# Patient Record
Sex: FEMALE | Race: WHITE | Employment: OTHER | URBAN - METROPOLITAN AREA
[De-identification: names, ages, dates, MRNs, and addresses within clinical notes are randomized per-mention and may not be internally consistent; named-entity substitution may affect disease eponyms.]

---

## 2018-01-21 PROBLEM — R19.7 DIARRHEA: Status: ACTIVE | Noted: 2018-01-21

## 2018-01-23 PROBLEM — K52.9 COLITIS: Status: ACTIVE | Noted: 2018-01-23

## 2018-01-30 PROBLEM — R26.9 GAIT DISTURBANCE: Status: ACTIVE | Noted: 2018-01-30

## 2018-01-30 PROBLEM — E86.0 DEHYDRATION, MODERATE: Status: ACTIVE | Noted: 2018-01-30

## 2018-01-30 PROBLEM — R29.898 LEG WEAKNESS, BILATERAL: Status: ACTIVE | Noted: 2018-01-30

## 2018-02-01 ENCOUNTER — HOSPITAL ENCOUNTER (OUTPATIENT)
Dept: OTHER | Age: 83
Discharge: HOME | End: 2018-02-01
Attending: INTERNAL MEDICINE | Admitting: INTERNAL MEDICINE

## 2018-02-09 LAB
ALBUMIN SERPL-MCNC: 3.6 GM/DL (ref 3.4–5)
ALP BLD-CCNC: 85 IU/L (ref 40–129)
ALT SERPL-CCNC: 7 U/L (ref 10–40)
ANION GAP SERPL CALCULATED.3IONS-SCNC: 14 MMOL/L (ref 4–16)
AST SERPL-CCNC: 11 IU/L (ref 15–37)
BILIRUB SERPL-MCNC: 0.3 MG/DL (ref 0–1)
BUN BLDV-MCNC: 17 MG/DL (ref 6–23)
CALCIUM SERPL-MCNC: 8.8 MG/DL (ref 8.3–10.6)
CHLORIDE BLD-SCNC: 101 MMOL/L (ref 99–110)
CO2: 27 MMOL/L (ref 21–32)
CREAT SERPL-MCNC: 1.1 MG/DL (ref 0.6–1.1)
GFR AFRICAN AMERICAN: 56 ML/MIN/1.73M2
GFR NON-AFRICAN AMERICAN: 46 ML/MIN/1.73M2
GLUCOSE BLD-MCNC: 87 MG/DL (ref 70–99)
HCT VFR BLD CALC: 36.3 % (ref 37–47)
HEMOGLOBIN: 10.9 GM/DL (ref 12.5–16)
MCH RBC QN AUTO: 26.9 PG (ref 27–31)
MCHC RBC AUTO-ENTMCNC: 30 % (ref 32–36)
MCV RBC AUTO: 89.6 FL (ref 78–100)
PDW BLD-RTO: 17.2 % (ref 11.7–14.9)
PLATELET # BLD: 343 K/CU MM (ref 140–440)
PMV BLD AUTO: 10.6 FL (ref 7.5–11.1)
POTASSIUM SERPL-SCNC: 4.4 MMOL/L (ref 3.5–5.1)
RBC # BLD: 4.05 M/CU MM (ref 4.2–5.4)
SODIUM BLD-SCNC: 142 MMOL/L (ref 135–145)
TOTAL PROTEIN: 7 GM/DL (ref 6.4–8.2)
WBC # BLD: 5.9 K/CU MM (ref 4–10.5)

## 2019-02-14 PROBLEM — K45.8 OTHER SPECIFIED ABDOMINAL HERNIA WITHOUT OBSTRUCTION OR GANGRENE: Status: ACTIVE | Noted: 2019-02-14

## 2019-06-07 ENCOUNTER — HOSPITAL ENCOUNTER (OUTPATIENT)
Dept: GENERAL RADIOLOGY | Age: 84
Discharge: HOME | End: 2019-06-07
Payer: MEDICARE

## 2019-06-07 ENCOUNTER — HOSPITAL ENCOUNTER (OUTPATIENT)
Age: 84
Discharge: HOME | End: 2019-06-07
Payer: MEDICARE

## 2019-06-07 DIAGNOSIS — R60.9 SWELLING: ICD-10-CM

## 2019-06-07 PROCEDURE — 73080 X-RAY EXAM OF ELBOW: CPT

## 2021-06-08 ENCOUNTER — APPOINTMENT (OUTPATIENT)
Dept: CT IMAGING | Age: 86
DRG: 392 | End: 2021-06-08
Payer: MEDICARE

## 2021-06-08 ENCOUNTER — HOSPITAL ENCOUNTER (INPATIENT)
Age: 86
LOS: 7 days | Discharge: OTHER INSTITUTION | DRG: 392 | End: 2021-06-15
Attending: EMERGENCY MEDICINE | Admitting: INTERNAL MEDICINE
Payer: MEDICARE

## 2021-06-08 DIAGNOSIS — E87.6 HYPOKALEMIA: ICD-10-CM

## 2021-06-08 DIAGNOSIS — I71.40 ABDOMINAL AORTIC ANEURYSM (AAA) WITHOUT RUPTURE: ICD-10-CM

## 2021-06-08 DIAGNOSIS — R77.8 TROPONIN I ABOVE REFERENCE RANGE: ICD-10-CM

## 2021-06-08 DIAGNOSIS — R19.7 DIARRHEA, UNSPECIFIED TYPE: Primary | ICD-10-CM

## 2021-06-08 DIAGNOSIS — K52.9 COLITIS: ICD-10-CM

## 2021-06-08 DIAGNOSIS — N17.9 AKI (ACUTE KIDNEY INJURY) (HCC): ICD-10-CM

## 2021-06-08 DIAGNOSIS — R53.83 FATIGUE, UNSPECIFIED TYPE: ICD-10-CM

## 2021-06-08 LAB
ALBUMIN SERPL-MCNC: 3.5 GM/DL (ref 3.4–5)
ALP BLD-CCNC: 95 IU/L (ref 40–129)
ALT SERPL-CCNC: <5 U/L (ref 10–40)
ANION GAP SERPL CALCULATED.3IONS-SCNC: 12 MMOL/L (ref 4–16)
AST SERPL-CCNC: 9 IU/L (ref 15–37)
BASOPHILS ABSOLUTE: 0 K/CU MM
BASOPHILS RELATIVE PERCENT: 0.3 % (ref 0–1)
BILIRUB SERPL-MCNC: 0.2 MG/DL (ref 0–1)
BUN BLDV-MCNC: 25 MG/DL (ref 6–23)
CALCIUM SERPL-MCNC: 8.7 MG/DL (ref 8.3–10.6)
CHLORIDE BLD-SCNC: 97 MMOL/L (ref 99–110)
CO2: 27 MMOL/L (ref 21–32)
CREAT SERPL-MCNC: 1.5 MG/DL (ref 0.6–1.1)
DIFFERENTIAL TYPE: ABNORMAL
EOSINOPHILS ABSOLUTE: 0.1 K/CU MM
EOSINOPHILS RELATIVE PERCENT: 1.2 % (ref 0–3)
GFR AFRICAN AMERICAN: 39 ML/MIN/1.73M2
GFR NON-AFRICAN AMERICAN: 32 ML/MIN/1.73M2
GLUCOSE BLD-MCNC: 108 MG/DL (ref 70–99)
HCT VFR BLD CALC: 36.4 % (ref 37–47)
HEMOGLOBIN: 11.3 GM/DL (ref 12.5–16)
IMMATURE NEUTROPHIL %: 0.4 % (ref 0–0.43)
LYMPHOCYTES ABSOLUTE: 0.7 K/CU MM
LYMPHOCYTES RELATIVE PERCENT: 8.1 % (ref 24–44)
MAGNESIUM: 1.9 MG/DL (ref 1.8–2.4)
MCH RBC QN AUTO: 27.7 PG (ref 27–31)
MCHC RBC AUTO-ENTMCNC: 31 % (ref 32–36)
MCV RBC AUTO: 89.2 FL (ref 78–100)
MONOCYTES ABSOLUTE: 1.1 K/CU MM
MONOCYTES RELATIVE PERCENT: 12.5 % (ref 0–4)
NUCLEATED RBC %: 0 %
PDW BLD-RTO: 14.7 % (ref 11.7–14.9)
PLATELET # BLD: 251 K/CU MM (ref 140–440)
PMV BLD AUTO: 10.6 FL (ref 7.5–11.1)
POTASSIUM SERPL-SCNC: 2.9 MMOL/L (ref 3.5–5.1)
RBC # BLD: 4.08 M/CU MM (ref 4.2–5.4)
SEGMENTED NEUTROPHILS ABSOLUTE COUNT: 7 K/CU MM
SEGMENTED NEUTROPHILS RELATIVE PERCENT: 77.5 % (ref 36–66)
SODIUM BLD-SCNC: 136 MMOL/L (ref 135–145)
TOTAL IMMATURE NEUTOROPHIL: 0.04 K/CU MM
TOTAL NUCLEATED RBC: 0 K/CU MM
TOTAL PROTEIN: 6.8 GM/DL (ref 6.4–8.2)
TROPONIN T: 0.03 NG/ML
WBC # BLD: 9 K/CU MM (ref 4–10.5)

## 2021-06-08 PROCEDURE — 85025 COMPLETE CBC W/AUTO DIFF WBC: CPT

## 2021-06-08 PROCEDURE — 6360000002 HC RX W HCPCS: Performed by: EMERGENCY MEDICINE

## 2021-06-08 PROCEDURE — 83735 ASSAY OF MAGNESIUM: CPT

## 2021-06-08 PROCEDURE — 70450 CT HEAD/BRAIN W/O DYE: CPT

## 2021-06-08 PROCEDURE — 74176 CT ABD & PELVIS W/O CONTRAST: CPT

## 2021-06-08 PROCEDURE — 6370000000 HC RX 637 (ALT 250 FOR IP): Performed by: EMERGENCY MEDICINE

## 2021-06-08 PROCEDURE — 80053 COMPREHEN METABOLIC PANEL: CPT

## 2021-06-08 PROCEDURE — 1200000000 HC SEMI PRIVATE

## 2021-06-08 PROCEDURE — 84484 ASSAY OF TROPONIN QUANT: CPT

## 2021-06-08 PROCEDURE — 81001 URINALYSIS AUTO W/SCOPE: CPT

## 2021-06-08 PROCEDURE — 2500000003 HC RX 250 WO HCPCS: Performed by: EMERGENCY MEDICINE

## 2021-06-08 PROCEDURE — 2580000003 HC RX 258: Performed by: EMERGENCY MEDICINE

## 2021-06-08 PROCEDURE — 99284 EMERGENCY DEPT VISIT MOD MDM: CPT

## 2021-06-08 PROCEDURE — 93005 ELECTROCARDIOGRAM TRACING: CPT | Performed by: EMERGENCY MEDICINE

## 2021-06-08 RX ORDER — 0.9 % SODIUM CHLORIDE 0.9 %
500 INTRAVENOUS SOLUTION INTRAVENOUS ONCE
Status: COMPLETED | OUTPATIENT
Start: 2021-06-08 | End: 2021-06-08

## 2021-06-08 RX ORDER — CIPROFLOXACIN 2 MG/ML
400 INJECTION, SOLUTION INTRAVENOUS ONCE
Status: COMPLETED | OUTPATIENT
Start: 2021-06-08 | End: 2021-06-09

## 2021-06-08 RX ADMIN — SODIUM CHLORIDE 500 ML: 9 INJECTION, SOLUTION INTRAVENOUS at 20:58

## 2021-06-08 RX ADMIN — METRONIDAZOLE 500 MG: 500 INJECTION, SOLUTION INTRAVENOUS at 23:53

## 2021-06-08 RX ADMIN — POTASSIUM BICARBONATE 40 MEQ: 782 TABLET, EFFERVESCENT ORAL at 20:58

## 2021-06-08 RX ADMIN — CIPROFLOXACIN 400 MG: 2 INJECTION, SOLUTION INTRAVENOUS at 23:46

## 2021-06-08 ASSESSMENT — PAIN SCALES - GENERAL: PAINLEVEL_OUTOF10: 0

## 2021-06-08 NOTE — ED NOTES
[address] [facility] notified Raudel Soria in case management     Dellar Cam  06/08/21 4136

## 2021-06-08 NOTE — ED PROVIDER NOTES
Triage Chief Complaint:   Failure To Thrive (son is no longer able to take care of mother, diarrhea for 2 weeks)    [place]:  Dorie Gaffney is a 80 y.o. female that presents with persistent diarrhea. Patient presents with son from home. Son reports that patient has had intermittent diarrhea issues for the last several months. Diarrhea is reported as \"soft stool that is just a mess\". No recent antibiotics. No recent hospitalizations. No blood in stool. Throughout the past 36 hours patient has had increasing weakness and fatigue. Patient today was \"just sitting on the couch with her mouth open and look like she was dead\". Son has been supplementing diet with increased fluid intake and Ensure intake. Son reports \"it is just really hard to get her to eat anything\". Son has been having a neighbor who is a nurse come over and help get her cleaned up after each bowel movement but he reports significant difficulty with this. Patient does have a known right-sided abdominal hernia has been present for over a decade and has not been causing patient problems. Son reports Cooper Many is too old for them to do anything about\". Patient on arrival is able to tell me her name and denies any pain at this time but is otherwise with limited history. ROS:  Limited as above. Past Medical History:   Diagnosis Date    COPD (chronic obstructive pulmonary disease) ([facility] [address]     Hypertension     Meniere's disease     DX with aneurysm of the heart last week. Past Surgical History:   Procedure Laterality Date    APPENDECTOMY       No family history on file. Social History     Socioeconomic History    Marital status:       Spouse name: Not on file    Number of children: Not on file    Years of education: Not on file    Highest education level: Not on file   Occupational History    Not on file   Tobacco Use    Smoking status: Never Smoker    Smokeless tobacco: Never Used   Substance and Sexual Activity    Alcohol use: No    Drug use: No    Sexual activity: Not Currently   Other Topics Concern    Not on file   Social History Narrative    Not on file     Social Determinants of Health     Financial Resource Strain:     Difficulty of Paying Living Expenses:    Food Insecurity:     Worried About Running Out of Food in the Last Year:     [address] in the Last Year:    Transportation Needs:     Lack of Transportation (Medical):  Lack of Transportation (Non-Medical):    Physical Activity:     Days of Exercise per Week:     Minutes of Exercise per Session:    Stress:     Feeling of Stress :    Social Connections:     Frequency of Communication with Friends and Family:     Frequency of Social Gatherings with Friends and Family:     Attends Christianity Services:     Active Member of Clubs or Organizations:     Attends Club or Organization Meetings:     Marital Status:    Intimate Partner Violence:     Fear of Current or Ex-Partner:     Emotionally Abused:     Physically Abused:     Sexually Abused:      Current Facility-Administered Medications   Medication Dose Route Frequency Provider Last Rate Last Admin    0.9 % sodium chloride bolus  500 mL Intravenous Once Billie Penn MD        potassium bicarb-citric acid (EFFER-K) effervescent tablet 40 mEq  40 mEq Oral Once Billie Penn MD         Current Outpatient Medications   Medication Sig Dispense Refill    aspirin 325 MG tablet Take 325 mg by mouth daily      LORazepam (ATIVAN) 0.5 MG tablet Take 0.5 mg by mouth every 6 hours as needed for Anxiety. Vj Bis propranolol (INDERAL) 20 MG tablet Take 20 mg by mouth 3 times daily      meclizine (ANTIVERT) 25 MG tablet Take 25 mg by mouth 3 times daily as needed      furosemide (LASIX) 20 MG tablet Take 20 mg by mouth 2 times daily      traZODone (DESYREL) 50 MG tablet Take 50 mg by mouth nightly      levothyroxine (SYNTHROID) 25 MCG tablet Take 1 tablet by mouth Daily 30 tablet 3     No Known Allergies    Nursing Notes Reviewed    Physical Exam:  ED Triage Vitals   Enc Vitals Group      BP 06/08/21 1749 120/78      Pulse 06/08/21 1749 68      Resp 06/08/21 1749 15      Temp 06/08/21 1753 97.9 °F (36.6 °C)      Temp src --       SpO2 06/08/21 1749 94 %      Weight 06/08/21 1749 109 lb (49.4 kg)      Height 06/08/21 1749 5' 2\" (1.575 m)      Head Circumference --       Peak Flow --       Pain Score --       Pain Loc --       Pain Edu? --       Excl. in [address]? --        My pulse ox interpretation is  normal    General appearance:  No acute distress. Appears stated age. Laying comfortably in bed nontoxic. Skin:  Warm. Dry. Eye:  Extraocular movements intact. Pupils equal round and reactive to light. Ears, nose, mouth and throat: Tacky mucous membranes. Neck:  Trachea midline. Extremity:  No swelling. Normal ROM     Heart:  Regular rate and rhythm, systolic ejection murmur heard along the left sternal border. Perfusion:  Intact. Respiratory:  Lungs clear to auscultation bilaterally. Respirations nonlabored. No respiratory distress. Abdominal:  Normal bowel sounds. Soft. Nontender. Non distended. Large right-sided abdominal hernia that is soft and easily reducible. Back:  No CVA tenderness to palpation     Neurological:  Alert but slightly drowsy. Moving x4 extremities. Psychiatric: Deferred.     I have reviewed and interpreted all of the currently available lab results from this visit (if applicable):  Results for orders placed or performed during the hospital encounter of 06/08/21   CMP   Result Value Ref Range    Sodium 136 135 - 145 MMOL/L    Potassium 2.9 (LL) 3.5 - 5.1 MMOL/L    Chloride 97 (L) 99 - 110 mMol/L    CO2 27 21 - 32 MMOL/L    BUN 25 (H) 6 - 23 MG/DL    CREATININE 1.5 (H) 0.6 - 1.1 MG/DL    Glucose 108 (H) 70 - 99 MG/DL    Calcium 8.7 8.3 - 10.6 MG/DL    Albumin 3.5 3.4 - 5.0 GM/DL    Total Protein 6.8 6.4 - 8.2 GM/DL    Total Bilirubin 0.2 0.0 - 1.0 MG/DL ALT <5 (L) 10 - 40 U/L    AST 9 (L) 15 - 37 IU/L    Alkaline Phosphatase 95 40 - 129 IU/L    GFR Non- 32 (L) >60 mL/min/1.73m2    GFR  39 (L) >60 mL/min/1.73m2    Anion Gap 12 4 - 16   Magnesium   Result Value Ref Range    Magnesium 1.9 1.8 - 2.4 mg/dl   CBC auto diff   Result Value Ref Range    WBC 9.0 4.0 - 10.5 K/CU MM    RBC 4.08 (L) 4.2 - 5.4 M/CU MM    Hemoglobin 11.3 (L) 12.5 - 16.0 GM/DL    Hematocrit 36.4 (L) 37 - 47 %    MCV 89.2 78 - 100 FL    MCH 27.7 27 - 31 PG    MCHC 31.0 (L) 32.0 - 36.0 %    RDW 14.7 11.7 - 14.9 %    Platelets 974 474 - 655 K/CU MM    MPV 10.6 7.5 - 11.1 FL    Differential Type AUTOMATED DIFFERENTIAL     Segs Relative 77.5 (H) 36 - 66 %    Lymphocytes % 8.1 (L) 24 - 44 %    Monocytes % 12.5 (H) 0 - 4 %    Eosinophils % 1.2 0 - 3 %    Basophils % 0.3 0 - 1 %    Segs Absolute 7.0 K/CU MM    Lymphocytes Absolute 0.7 K/CU MM    Monocytes Absolute 1.1 K/CU MM    Eosinophils Absolute 0.1 K/CU MM    Basophils Absolute 0.0 K/CU MM    Nucleated RBC % 0.0 %    Total Nucleated RBC 0.0 K/CU MM    Total Immature Neutrophil 0.04 K/CU MM    Immature Neutrophil % 0.4 0 - 0.43 %   Troponin   Result Value Ref Range    Troponin T 0.030 (H) <0.01 NG/ML      Radiographs (if obtained):  [] The following radiograph was interpreted by myself in the absence of a radiologist:   [] Radiologist's Report Reviewed:  CT ABDOMEN PELVIS W IV CONTRAST Additional Contrast? None    (Results Pending)   CT HEAD WO CONTRAST    (Results Pending)         EKG (if obtained): (All EKG's are interpreted by myself in the absence of a cardiologist)  12 lead EKG per my interpretation:  Normal Sinus Rhythm at 66  Axis is   Normal  QTc is  within an acceptable range  There is no specific T wave changes appreciated. There is no specific ST wave changes appreciated. No STEMI    Prior EKG to compare with was available and no clinically significant change in her morphology compared to prior from 2016. Chart review shows recent radiographs:  No results found. MDM:  Pt presents as above. Emergent conditions considered. Presentation prompted initial labs, EKG and imaging. EKG with normal sinus rhythm as above. IVs established and small IV fluid bolus of 500 cc of normal saline is given. Case management is consulted to assist with further home health care resources. CBC is without clinically significant derangement. CMP is with moderate hypokalemia of 2.9 which was repleted. Patient is with acute kidney injury with a BUN of 25 and creatinine 1.5. Mild hyperglycemia without metabolic acidosis. Magnesium within normal limits. Troponin is abnormal but the setting of acute kidney injury reassuring EKG and no chest pain; doubt ACS but this will need to be trended. CT head negative for acute process. CT abdomen pelvis demonstrating a likely transverse colitis. Patient started on cipro and flagyl. Patient will require admission given her age and overall clinical picture. Questions sought and answered with the patient. They voice understanding and agree with plan. Clinical Impression:  1. Diarrhea, unspecified type    2. Hypokalemia    3. Fatigue, unspecified type    4. Troponin I above reference range    5. WAGNER (acute kidney injury) ([address]    6. Colitis    7. Abdominal aortic aneurysm (AAA) without rupture ([address]      Disposition referral (if applicable):  No follow-up provider specified. Disposition medications (if applicable):  New Prescriptions    No medications on file       Comment: Please note this report has been produced using speech recognition software and may contain errors related to that system including errors in grammar, punctuation, and spelling, as well as words and phrases that may be inappropriate. If there are any questions or concerns please feel free to contact the dictating provider for clarification.        Jennifer Ortiz MD  06/08/21 1460

## 2021-06-09 LAB
ADENOVIRUS F 40 41 PCR: NOT DETECTED
ANION GAP SERPL CALCULATED.3IONS-SCNC: 12 MMOL/L (ref 4–16)
ASTROVIRUS PCR: NOT DETECTED
BASOPHILS ABSOLUTE: 0 K/CU MM
BASOPHILS RELATIVE PERCENT: 0.6 % (ref 0–1)
BUN BLDV-MCNC: 21 MG/DL (ref 6–23)
CALCIUM SERPL-MCNC: 8 MG/DL (ref 8.3–10.6)
CAMPYLOBACTER PCR: NOT DETECTED
CHLORIDE BLD-SCNC: 103 MMOL/L (ref 99–110)
CO2: 26 MMOL/L (ref 21–32)
CREAT SERPL-MCNC: 1.2 MG/DL (ref 0.6–1.1)
CRYPTOSPORIDIUM PCR: NOT DETECTED
CYCLOSPORA CAYETANENSIS PCR: NOT DETECTED
DIFFERENTIAL TYPE: ABNORMAL
E COLI 0157 PCR: NOT DETECTED
E COLI ENTEROAGGREGATIVE PCR: NOT DETECTED
E COLI ENTEROPATHOGENIC PCR: NOT DETECTED
E COLI ENTEROTOXIGENIC PCR: NOT DETECTED
E COLI SHIGA LIKE TOXIN PCR: NOT DETECTED
E COLI SHIGELLA/ENTEROINVASIVE PCR: NOT DETECTED
EKG ATRIAL RATE: 66 BPM
EKG DIAGNOSIS: NORMAL
EKG P AXIS: 99 DEGREES
EKG P-R INTERVAL: 190 MS
EKG Q-T INTERVAL: 342 MS
EKG QRS DURATION: 80 MS
EKG QTC CALCULATION (BAZETT): 358 MS
EKG R AXIS: 3 DEGREES
EKG T AXIS: 18 DEGREES
EKG VENTRICULAR RATE: 66 BPM
ENTAMOEBA HISTOLYTICA PCR: NOT DETECTED
EOSINOPHILS ABSOLUTE: 0.1 K/CU MM
EOSINOPHILS RELATIVE PERCENT: 1.1 % (ref 0–3)
GFR AFRICAN AMERICAN: 50 ML/MIN/1.73M2
GFR NON-AFRICAN AMERICAN: 41 ML/MIN/1.73M2
GIARDIA LAMBLIA PCR: NOT DETECTED
GLUCOSE BLD-MCNC: 96 MG/DL (ref 70–99)
HCT VFR BLD CALC: 31.6 % (ref 37–47)
HEMOGLOBIN: 9.6 GM/DL (ref 12.5–16)
IMMATURE NEUTROPHIL %: 0.2 % (ref 0–0.43)
LYMPHOCYTES ABSOLUTE: 0.7 K/CU MM
LYMPHOCYTES RELATIVE PERCENT: 11.8 % (ref 24–44)
MAGNESIUM: 1.7 MG/DL (ref 1.8–2.4)
MCH RBC QN AUTO: 27 PG (ref 27–31)
MCHC RBC AUTO-ENTMCNC: 30.4 % (ref 32–36)
MCV RBC AUTO: 89 FL (ref 78–100)
MONOCYTES ABSOLUTE: 0.9 K/CU MM
MONOCYTES RELATIVE PERCENT: 14.7 % (ref 0–4)
NOROVIRUS GI GII PCR: NOT DETECTED
NUCLEATED RBC %: 0 %
PDW BLD-RTO: 14.7 % (ref 11.7–14.9)
PLATELET # BLD: 211 K/CU MM (ref 140–440)
PLESIOMONAS SHIGELLOIDES PCR: NOT DETECTED
PMV BLD AUTO: 10.7 FL (ref 7.5–11.1)
POTASSIUM SERPL-SCNC: 2.9 MMOL/L (ref 3.5–5.1)
POTASSIUM SERPL-SCNC: 3.7 MMOL/L (ref 3.5–5.1)
RBC # BLD: 3.55 M/CU MM (ref 4.2–5.4)
ROTAVIRUS A PCR: NOT DETECTED
SALMONELLA PCR: NOT DETECTED
SAPOVIRUS PCR: NOT DETECTED
SEGMENTED NEUTROPHILS ABSOLUTE COUNT: 4.5 K/CU MM
SEGMENTED NEUTROPHILS RELATIVE PERCENT: 71.6 % (ref 36–66)
SODIUM BLD-SCNC: 141 MMOL/L (ref 135–145)
TOTAL IMMATURE NEUTOROPHIL: 0.01 K/CU MM
TOTAL NUCLEATED RBC: 0 K/CU MM
VIBRIO CHOLERAE PCR: NOT DETECTED
VIBRIO PCR: NOT DETECTED
WBC # BLD: 6.3 K/CU MM (ref 4–10.5)
YERSINIA ENTEROCOLITICA PCR: NOT DETECTED

## 2021-06-09 PROCEDURE — 97162 PT EVAL MOD COMPLEX 30 MIN: CPT

## 2021-06-09 PROCEDURE — 97535 SELF CARE MNGMENT TRAINING: CPT

## 2021-06-09 PROCEDURE — 97530 THERAPEUTIC ACTIVITIES: CPT

## 2021-06-09 PROCEDURE — 80048 BASIC METABOLIC PNL TOTAL CA: CPT

## 2021-06-09 PROCEDURE — 2500000003 HC RX 250 WO HCPCS: Performed by: INTERNAL MEDICINE

## 2021-06-09 PROCEDURE — 1200000000 HC SEMI PRIVATE

## 2021-06-09 PROCEDURE — 84132 ASSAY OF SERUM POTASSIUM: CPT

## 2021-06-09 PROCEDURE — 6360000002 HC RX W HCPCS: Performed by: INTERNAL MEDICINE

## 2021-06-09 PROCEDURE — 83735 ASSAY OF MAGNESIUM: CPT

## 2021-06-09 PROCEDURE — 93010 ELECTROCARDIOGRAM REPORT: CPT | Performed by: INTERNAL MEDICINE

## 2021-06-09 PROCEDURE — 94761 N-INVAS EAR/PLS OXIMETRY MLT: CPT

## 2021-06-09 PROCEDURE — 6370000000 HC RX 637 (ALT 250 FOR IP): Performed by: INTERNAL MEDICINE

## 2021-06-09 PROCEDURE — 2580000003 HC RX 258: Performed by: INTERNAL MEDICINE

## 2021-06-09 PROCEDURE — 85025 COMPLETE CBC W/AUTO DIFF WBC: CPT

## 2021-06-09 PROCEDURE — 87507 IADNA-DNA/RNA PROBE TQ 12-25: CPT

## 2021-06-09 PROCEDURE — 99213 OFFICE O/P EST LOW 20 MIN: CPT

## 2021-06-09 PROCEDURE — 97167 OT EVAL HIGH COMPLEX 60 MIN: CPT

## 2021-06-09 PROCEDURE — 36415 COLL VENOUS BLD VENIPUNCTURE: CPT

## 2021-06-09 RX ORDER — ASPIRIN 325 MG
325 TABLET ORAL DAILY
Status: DISCONTINUED | OUTPATIENT
Start: 2021-06-09 | End: 2021-06-16 | Stop reason: HOSPADM

## 2021-06-09 RX ORDER — SODIUM CHLORIDE 9 MG/ML
INJECTION, SOLUTION INTRAVENOUS CONTINUOUS
Status: DISCONTINUED | OUTPATIENT
Start: 2021-06-09 | End: 2021-06-12

## 2021-06-09 RX ORDER — ACETAMINOPHEN 325 MG/1
650 TABLET ORAL EVERY 6 HOURS PRN
Status: DISCONTINUED | OUTPATIENT
Start: 2021-06-09 | End: 2021-06-16 | Stop reason: HOSPADM

## 2021-06-09 RX ORDER — POTASSIUM CHLORIDE 7.45 MG/ML
10 INJECTION INTRAVENOUS PRN
Status: DISCONTINUED | OUTPATIENT
Start: 2021-06-09 | End: 2021-06-16 | Stop reason: HOSPADM

## 2021-06-09 RX ORDER — LEVOTHYROXINE SODIUM 0.03 MG/1
25 TABLET ORAL DAILY
Status: DISCONTINUED | OUTPATIENT
Start: 2021-06-09 | End: 2021-06-16 | Stop reason: HOSPADM

## 2021-06-09 RX ORDER — SODIUM CHLORIDE 0.9 % (FLUSH) 0.9 %
5-40 SYRINGE (ML) INJECTION PRN
Status: DISCONTINUED | OUTPATIENT
Start: 2021-06-09 | End: 2021-06-16 | Stop reason: HOSPADM

## 2021-06-09 RX ORDER — CIPROFLOXACIN 2 MG/ML
400 INJECTION, SOLUTION INTRAVENOUS EVERY 24 HOURS
Status: DISCONTINUED | OUTPATIENT
Start: 2021-06-10 | End: 2021-06-12

## 2021-06-09 RX ORDER — POTASSIUM CHLORIDE 20 MEQ/1
40 TABLET, EXTENDED RELEASE ORAL PRN
Status: DISCONTINUED | OUTPATIENT
Start: 2021-06-09 | End: 2021-06-16 | Stop reason: HOSPADM

## 2021-06-09 RX ORDER — HEPARIN SODIUM 5000 [USP'U]/ML
5000 INJECTION, SOLUTION INTRAVENOUS; SUBCUTANEOUS EVERY 8 HOURS SCHEDULED
Status: DISCONTINUED | OUTPATIENT
Start: 2021-06-09 | End: 2021-06-16 | Stop reason: HOSPADM

## 2021-06-09 RX ORDER — SODIUM CHLORIDE 9 MG/ML
25 INJECTION, SOLUTION INTRAVENOUS PRN
Status: DISCONTINUED | OUTPATIENT
Start: 2021-06-09 | End: 2021-06-16 | Stop reason: HOSPADM

## 2021-06-09 RX ORDER — PROPRANOLOL HYDROCHLORIDE 10 MG/1
20 TABLET ORAL DAILY
Status: DISCONTINUED | OUTPATIENT
Start: 2021-06-09 | End: 2021-06-16 | Stop reason: HOSPADM

## 2021-06-09 RX ORDER — MECLIZINE HYDROCHLORIDE 25 MG/1
25 TABLET ORAL 3 TIMES DAILY PRN
Status: DISCONTINUED | OUTPATIENT
Start: 2021-06-09 | End: 2021-06-16 | Stop reason: HOSPADM

## 2021-06-09 RX ORDER — ONDANSETRON 4 MG/1
4 TABLET, ORALLY DISINTEGRATING ORAL EVERY 8 HOURS PRN
Status: DISCONTINUED | OUTPATIENT
Start: 2021-06-09 | End: 2021-06-16 | Stop reason: HOSPADM

## 2021-06-09 RX ORDER — POLYETHYLENE GLYCOL 3350 17 G/17G
17 POWDER, FOR SOLUTION ORAL DAILY PRN
Status: DISCONTINUED | OUTPATIENT
Start: 2021-06-09 | End: 2021-06-16 | Stop reason: HOSPADM

## 2021-06-09 RX ORDER — ACETAMINOPHEN 650 MG/1
650 SUPPOSITORY RECTAL EVERY 6 HOURS PRN
Status: DISCONTINUED | OUTPATIENT
Start: 2021-06-09 | End: 2021-06-16 | Stop reason: HOSPADM

## 2021-06-09 RX ORDER — SODIUM CHLORIDE 0.9 % (FLUSH) 0.9 %
5-40 SYRINGE (ML) INJECTION EVERY 12 HOURS SCHEDULED
Status: DISCONTINUED | OUTPATIENT
Start: 2021-06-09 | End: 2021-06-16 | Stop reason: HOSPADM

## 2021-06-09 RX ORDER — TRAZODONE HYDROCHLORIDE 50 MG/1
50 TABLET ORAL NIGHTLY
Status: DISCONTINUED | OUTPATIENT
Start: 2021-06-09 | End: 2021-06-16 | Stop reason: HOSPADM

## 2021-06-09 RX ORDER — ONDANSETRON 2 MG/ML
4 INJECTION INTRAMUSCULAR; INTRAVENOUS EVERY 6 HOURS PRN
Status: DISCONTINUED | OUTPATIENT
Start: 2021-06-09 | End: 2021-06-16 | Stop reason: HOSPADM

## 2021-06-09 RX ADMIN — METRONIDAZOLE 500 MG: 500 INJECTION, SOLUTION INTRAVENOUS at 11:12

## 2021-06-09 RX ADMIN — TRAZODONE HYDROCHLORIDE 50 MG: 50 TABLET ORAL at 20:16

## 2021-06-09 RX ADMIN — POTASSIUM CHLORIDE 10 MEQ: 7.46 INJECTION, SOLUTION INTRAVENOUS at 18:33

## 2021-06-09 RX ADMIN — ASPIRIN 325 MG ORAL TABLET 325 MG: 325 PILL ORAL at 11:12

## 2021-06-09 RX ADMIN — POTASSIUM CHLORIDE 10 MEQ: 7.46 INJECTION, SOLUTION INTRAVENOUS at 13:44

## 2021-06-09 RX ADMIN — POTASSIUM CHLORIDE 10 MEQ: 7.46 INJECTION, SOLUTION INTRAVENOUS at 17:25

## 2021-06-09 RX ADMIN — HEPARIN SODIUM 5000 UNITS: 5000 INJECTION INTRAVENOUS; SUBCUTANEOUS at 06:35

## 2021-06-09 RX ADMIN — HEPARIN SODIUM 5000 UNITS: 5000 INJECTION INTRAVENOUS; SUBCUTANEOUS at 20:16

## 2021-06-09 RX ADMIN — POTASSIUM CHLORIDE 10 MEQ: 7.46 INJECTION, SOLUTION INTRAVENOUS at 16:18

## 2021-06-09 RX ADMIN — POTASSIUM CHLORIDE 10 MEQ: 7.46 INJECTION, SOLUTION INTRAVENOUS at 20:08

## 2021-06-09 RX ADMIN — METRONIDAZOLE 500 MG: 500 INJECTION, SOLUTION INTRAVENOUS at 17:26

## 2021-06-09 RX ADMIN — SODIUM CHLORIDE: 9 INJECTION, SOLUTION INTRAVENOUS at 17:25

## 2021-06-09 RX ADMIN — POTASSIUM CHLORIDE 10 MEQ: 7.46 INJECTION, SOLUTION INTRAVENOUS at 15:04

## 2021-06-09 RX ADMIN — HEPARIN SODIUM 5000 UNITS: 5000 INJECTION INTRAVENOUS; SUBCUTANEOUS at 15:08

## 2021-06-09 RX ADMIN — LEVOTHYROXINE SODIUM 25 MCG: 25 TABLET ORAL at 06:35

## 2021-06-09 RX ADMIN — SODIUM CHLORIDE: 9 INJECTION, SOLUTION INTRAVENOUS at 02:48

## 2021-06-09 ASSESSMENT — PAIN SCALES - GENERAL
PAINLEVEL_OUTOF10: 0
PAINLEVEL_OUTOF10: 0

## 2021-06-09 NOTE — CONSULTS
[address] [place] [address], [address] [place] [address]                                  CONSULTATION    PATIENT NAME: Rickey Casanova                  :        1921  MED REC NO:   [id number]                          ROOM:       0499  ACCOUNT NO:   [de-identified]                           ADMIT DATE: 2021  PROVIDER:     Manda Renner MD    CONSULT DATE:  2021    CHIEF COMPLAINT:  History of chronic diarrhea. HISTORY OF PRESENT ILLNESS:  Is as follows: The patient is a  63-year-old white female with past medical history significant for  hypertension, COPD, Meniere's disease, and thoracic/abdominal aortic  aneurysm, who lives with her son and was brought to the emergency room  with a history of chronic diarrhea for many months with recent worsening  in the last two weeks, and her stools are soft in color. There is no  history of abdominal pain, nausea, vomiting, hematemesis, melena or  hematochezia. According to the patient's son, the patient has become  dehydrated and weak also which prompted him to bring her to the  hospital.    In the ER, the patient had a Chem profile drawn which was remarkable for  a potassium of 2.9. LFTs were within normal limits    CBC showed WBC count of 9, hemoglobin 11.3, and platelet count of  472,128. CT scan of the abdomen and pelvis was done, which showed a diffuse mural  thickening of the large bowel especially the transverse colon and the  left colon suggestive of nonspecific colitis. Also a large ventral  abdominal hernia was noted as well along with 5.7-cm abdominal aortic  aneurysm. The patient was admitted for further workup and management. The patient  has not had a recent EGD or colonoscopy done. The patient has been seen  by the GI consultant, Dr. Lester Sherwood on 2018 for abdominal  distention and/ileus. The patient is hemodynamically stable.     REVIEW OF SYSTEMS:  Cannot be assessed since the patient is hard of  hearing. PAST MEDICAL HISTORY:  Significant for history of hypertension, COPD,  and Meniere's disease. FAMILY HISTORY:  Noncontributory. MEDICATIONS:  Please refer to the chart. The patient is not taking  NSAIDs or anticoagulants (the patient only takes one 325 mg of aspirin  per day.)    SOCIOECONOMIC HISTORY:  No history of EtOH abuse. The patient does not  smoke cigarettes. PAST SURGICAL HISTORY:  The patient has had appendectomy done. ALLERGIES:  No known drug allergies. PHYSICAL EXAMINATION:  GENERAL:  Shows a 79-year-old white female of thin build and fair  nutritional status for her age, who is lying flat in bed, in no acute  distress. She is awake, alert, but hard of hearing. VITAL SIGNS:  Please refer to the chart. HEENT:  Examination shows skull to be atraumatic. NECK:  Supple. CHEST:  Clear. HEART:  S1 and S2 is normal.  ABDOMEN:  Soft, slightly distended, nontender. Liver and spleen are not  palpable and a large ventral hernia is present. There is no guarding or  rigidity. RECTAL:  Exam is deferred. CNS:  Exam shows the patient to be awake and oriented but is hard of  hearing. The patient is moving all four extremities. MUSCULOSKELETAL SYSTEM:  Exam shows evidence of degenerative joint  disease changes. LABORATORY DATA:  As above mentioned. IMPRESSION:  A 79-year-old white female who presents with  1. History of chronic diarrhea with recent worsening and along with  generalized weakness and dehydration and with abnormal CT scan and this  has ruled out infectious colitis. 2.  Rule out C. diff colitis. RECOMMENDATIONS:  1.  I agree with present management with IV fluids  2. We will start the patient on full liquid diet, advance as tolerated. 3.  We will check CBC, Chem profile, amylase, lipase in a.m.  4.  We will follow up on stool studies also for GI disease panel and C.  diff toxin.   5.  In view of the patient's advanced age, no need for colonoscopy at  this point. However if the patient continues to have diarrhea, will  need a flexible sigmoidoscopy.         Debora Soler MD    D: 06/09/2021 5:07:02       T: 06/09/2021 5:13:32     [email or web address]  Job#: [id number]     Doc#: [id number]    CC:
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
## 2021-06-09 NOTE — PROGRESS NOTES
Comprehensive Nutrition Assessment    Type and Reason for Visit:  Initial, Positive Nutrition Screen (low BMI for age)    Nutrition Recommendations/Plan:   · Continue Full Liquid Diet  · Begin standard high toan oral nutrition supplement bid  · Advance to Low Fiber/Fat Diet as timely as able  · Nutrition focused physical exam for malnutrition at reassess    Nutrition Assessment:  Pt admiited with acute on chronic colitis, diarrhea (for the past 2 weeks to months?), WAGNER, hypokalemia, lethargy. H/O Ménière's disease, insomnia, CHF, abdominal aortic aneurysm. Pt is sleeping during my room visit. No po intake yet on Full Liquids. Will order standard supplement and continue to follow as high nutrition risk. Malnutrition Assessment:  Malnutrition Status:  Insufficient data    Context:  Chronic Illness       Estimated Daily Nutrient Needs:  Energy (kcal):  1251-3507 (30-35 kcal/kg); Weight Used for Energy Requirements:  Current     Protein (g):  50-60 (1-1.2 g/kg IBW); Weight Used for Protein Requirements:  Ideal        Fluid (ml/day):  4446-2352; Method Used for Fluid Requirements:  1 ml/kcal      Nutrition Related Findings:  K 2.9, Cr 1.5, C diff R/O      Wounds:  Multiple, Skin Tears, Pressure Injury, Wound Consult Pending       Current Nutrition Therapies:    ADULT DIET;  Full Liquid  Adult Oral Nutrition Supplement; Standard High Calorie/High Protein Oral Supplement    Anthropometric Measures:  · Height: 5' 2\" (157.5 cm)  · Current Body Weight: 109 lb (49.4 kg)   · Admission Body Weight:  (?)    · Usual Body Weight: 114 lb 10.2 oz (52 kg) (2/5/18)     · Ideal Body Weight: 110 lbs; % Ideal Body Weight 99.1 %   · BMI: 19.9  · BMI Categories: Underweight (BMI less than 22) age over 72       Nutrition Diagnosis:   · Predicted inadequate energy intake related to altered GI function, altered GI structure as evidenced by diarrhea    Nutrition Interventions:   Food and/or Nutrient Delivery:  Continue Current Diet, Start Oral Nutrition Supplement  Nutrition Education/Counseling:  No recommendation at this time   Coordination of Nutrition Care:  Continue to monitor while inpatient, Feeding Assistance/Environment Change    Goals:  Pt will consume at least half of her meals and supplements       Nutrition Monitoring and Evaluation:   Behavioral-Environmental Outcomes:  None Identified   Food/Nutrient Intake Outcomes:  Diet Advancement/Tolerance, Food and Nutrient Intake, Supplement Intake  Physical Signs/Symptoms Outcomes:  Biochemical Data, Diarrhea, GI Status, Meal Time Behavior, Nutrition Focused Physical Findings, Skin, Weight     Discharge Planning:    Continue Oral Nutrition Supplement     Electronically signed by Van Loza RD, LD on 6/9/21 at 11:42 AM EDT    Contact: [phone]

## 2021-06-09 NOTE — PROGRESS NOTES
Occupational Therapy   Occupational Therapy Initial Assessment  Date: 2021   Patient Name: Tamir Crisostomo  MRN: [id number]     : 1921    Date of Service: 2021    Discharge Recommendations:  Subacute/Skilled Nursing Facility  OT Equipment Recommendations  Other: defer    Assessment   Performance deficits / Impairments: Decreased coordination;Decreased endurance;Decreased functional mobility ; Decreased ADL status; Decreased posture;Decreased balance;Decreased strength;Decreased safe awareness;Decreased high-level IADLs;Decreased cognition  Assessment: Pt is a 80 y.o. F admitted form home. Pt at baseline needs assistance w/ ADLs and high level IADLs and independent w/ functional mobility/transfers using a RW, Cane, or Wheelchair-manual. Pt currently presents w/ above deficits and would benefit from continued acute care OT services w/ discharge to SNF in order to return home to [facility]. Treatment Diagnosis: colitis  Prognosis: Fair  Decision Making: High Complexity  OT Education: OT Role;Transfer Training;Plan of Care;Energy Conservation;Precautions  Barriers to Learning: confusion  REQUIRES OT FOLLOW UP: Yes  Activity Tolerance  Activity Tolerance: Patient limited by fatigue  Safety Devices  Safety Devices in place: Yes  Type of devices: Bed alarm in place;Call light within reach; Left in bed;Gait belt  Restraints  Initially in place: No           Patient Diagnosis(es): The primary encounter diagnosis was Diarrhea, unspecified type. Diagnoses of Hypokalemia, Fatigue, unspecified type, Troponin I above reference range, WAGNER (acute kidney injury) ([address] Colitis, and Abdominal aortic aneurysm (AAA) without rupture ([address] were also pertinent to this visit. has a past medical history of COPD (chronic obstructive pulmonary disease) ([address] Hypertension, and Meniere's disease. has a past surgical history that includes Appendectomy.     Treatment Diagnosis: colitis      Restrictions  Restrictions/Precautions Restrictions/Precautions: General Precautions, Fall Risk  Required Braces or Orthoses?: No    Subjective   General  Chart Reviewed: Yes  Patient assessed for rehabilitation services?: Yes  Response to previous treatment: Patient with no complaints from previous session  Family / Caregiver Present: No  Patient Currently in Pain: No    Social/Functional History  Social/Functional History  Lives With: Son (Kodi Talavera: works part-time (delivers flowers) 3x/week)  Type of Home: [place] Layout: One level  Home Access: Stairs to enter with rails  [address] [facility]  - Number of Steps: 5  Entrance Stairs - Rails: Both (too wide apart; pt holds 1 rail + SPC and CGA from son at baseline )  Bathroom Shower/Tub: Tub/Shower unit (spongebathes)  Bathroom Toilet: Handicap height (ETS with grabbar clamped on tub with use)  Bathroom Equipment: Grab bars around toilet  Home Equipment: Rolling walker, Sara Sill, [address] (chair risers)  Receives Help From: Family (son)  ADL Assistance: Independent  Homemaking Assistance: Needs assistance (son does daily with  1x/ mo, son does meals)  Ambulation Assistance: Independent (Mod Ind using RW (household distances only) )  Transfer Assistance: Independent (Mod Ind from furniture with elevated seat height; required physical assistance from regular/lower surface heights at baseline per son)  Active : No  Patient's  Info: son drives  Mode of Transportation: Truck, Family  Occupation: Retired  Type of occupation: Office work  Leisure & Hobbies: Lissy Carry, clean, read; pt has meds in pill box set up by son, son manages finances. Outings to get hair done once a week. Son does grocery shopping. Additional Comments: Above information needs to be verified due to Pt poor historian.        Objective   Vision: Within Functional Limits  Hearing: Exceptions to [facility]  Hearing Exceptions: Bilateral hearing aid    Orientation  Overall Orientation Status: Impaired  Orientation Level: Disoriented to place; Disoriented to time;Disoriented to situation;Oriented to person  Observation/Palpation  Posture: Fair  Observation: Pt supine in bed and agreeable to therapy. Pt presents w/ poor dynamic sitting balance. Balance  Sitting Balance: Contact guard assistance  Standing Balance  Comment: DNT due to poor dynamic sitting balance and pt confusion. Functional Mobility  Functional Mobility Comments: DNT due to poor dynamic sitting balance and pt confusion. ADL  Feeding: Minimal assistance  Grooming: Minimal assistance (Pt washed face supine in bed requiring min VCs for initiation of task.)  UE Bathing: Minimal assistance  LE Bathing: Maximum assistance  UE Dressing: Minimal assistance  LE Dressing: Maximum assistance  Toileting: Maximum assistance  Tone RUE  RUE Tone: Normotonic  Tone LUE  LUE Tone: Normotonic  Coordination  Movements Are Fluid And Coordinated: No  Coordination and Movement description: Decreased speed;Right UE;Left UE     Bed mobility  Rolling to Right: Minimal assistance  Supine to Sit: Minimal assistance  Sit to Supine: Minimal assistance  Scooting: Minimal assistance     Vision - Basic Assessment  Prior Vision: No visual deficits  Visual History: No significant visual history  Patient Visual Report: No visual complaint reported.   Cognition  Overall Cognitive Status: Exceptions  Arousal/Alertness: Appropriate responses to stimuli  Following Commands: Inconsistently follows commands  Safety Judgement: Decreased awareness of need for safety;Decreased awareness of need for assistance  Problem Solving: Assistance required to identify errors made;Assistance required to generate solutions;Assistance required to correct errors made;Assistance required to implement solutions;Decreased awareness of errors  Insights: Decreased awareness of deficits  Initiation: Requires cues for some  Sequencing: Requires cues for some  Perception  Overall Perceptual Status: [facility]     Sensation  Overall Sensation Status: WNL        LUE AROM (degrees)  LUE AROM : WNL  Left Hand AROM (degrees)  Left Hand AROM: WNL  RUE AROM (degrees)  RUE AROM : WNL  Right Hand AROM (degrees)  Right Hand AROM: WNL  LUE Strength  Gross LUE Strength: WFL  L Hand General: 4/5  RUE Strength  Gross RUE Strength: WFL  R Hand General: 4/5       Therapeutic Activity Training:   Therapeutic activity training was instructed today. Cues were given for safety, sequence, UE/LE placement, awareness, and balance. Activities performed today included bed mobility training, sup-sit, sit-sup. Self Care Training:   Cues were given for safety, sequence, UE/LE placement, visual cues, and balance. Activities performed today included grooming. Plan   Plan  Times per week: 3x+  Times per day: Daily  Current Treatment Recommendations: Strengthening, Endurance Training, Patient/Caregiver Education & Training, Cognitive Reorientation, Equipment Evaluation, Education, & procurement, Self-Care / ADL, Balance Training, Functional Mobility Training, Safety Education & Training             AM-PAC Score        AM-[facility] Inpatient Daily Activity Raw Score: 15 (06/09/21 1120)  AM-PAC Inpatient ADL T-Scale Score : 34.69 (06/09/21 1120)  ADL Inpatient CMS 0-100% Score: 56.46 (06/09/21 1120)  ADL Inpatient CMS G-Code Modifier : CK (06/09/21 1120)    Goals  Short term goals  Time Frame for Short term goals: discharge  Short term goal 1: Pt will complete UB/LB dressing Mao. Short term goal 2: Pt will complete UB/LB bathing Mao. Short term goal 3: Pt will complete all aspects of toileting Mao. Short term goal 4: Pt will complete functional activity in stand to inc activity tolerance and endurance to stand ~ 5mins. Short term goal 5: Pt will perform ther ex/ther act to inc UB strength from 4/5 to 5/5.        Therapy Time   Individual Concurrent Group Co-treatment   Time In 0100         Time Out 1010         Minutes 33             Total treatment time: 33  Treatment minutes: [address] [facility] S/OT

## 2021-06-09 NOTE — CONSULTS
[id number] [facility], 7/26/1921, 4104/4104-A, 6/9/2021    History  [place]:  The primary encounter diagnosis was Diarrhea, unspecified type. Diagnoses of Hypokalemia, Fatigue, unspecified type, Troponin I above reference range, WAGNER (acute kidney injury) ([facility] Colitis, and Abdominal aortic aneurysm (AAA) without rupture ([facility] were also pertinent to this visit. Patient  has a past medical history of COPD (chronic obstructive pulmonary disease) ([facility] Hypertension, and Meniere's disease. Patient  has a past surgical history that includes Appendectomy. Subjective:  Patient states:  \"I'm cold. \"    Pain:  Denies pain. Communication with other providers:  Handoff to RN  Restrictions: general precautions, fall risk    Home Setup/Prior level of function  Pt lives with son who performs all ADLs for pt. Pt is not always supervised at home. Pt is a max assist for stand pivots to [facility] and son propels throughout home. Examination of body systems (includes body structures/functions, activity/participation limitations):  · Observation:  Pt supine in bed with son in room upon arrival and agreeable to therapy  · Vision:  Wears glasses  · Hearing:  [place],has hearing aides  · Cardiopulmonary:  No O2 needs    Musculoskeletal  · ROM R/L:  WFL. · Strength R/L:  2+/5, significant impairment in function and endurance. · Neuro:  Unable to formally test      Mobility:  · Rolling L/R:  Max A performed bilaterally during pericare  · Supine to sit: Max A with assist at LEs and trunk with cues for sequencing. Pt able to advance R LE easier than L LE  · Transfers: Pt denied transfers at this time  · Sitting balance:  Pt sat EOB 5 minutes CGA with no LOB throughout.     · Standing balance:  NT.    · Gait: NT    [facility] 6 Clicks Inpatient Mobility:  AM-PAC Inpatient Mobility Raw Score : 10    Safety: patient left supine in bed with alarm on, call light within reach, RN

## 2021-06-09 NOTE — PLAN OF CARE
Nutrition Problem #1: Predicted inadequate energy intake  Intervention: Food and/or Nutrient Delivery: Continue Current Diet, Start Oral Nutrition Supplement  Nutritional Goals: Pt will consume at least half of her meals and supplements

## 2021-06-09 NOTE — PROGRESS NOTES
Hospitalist Progress Note      Name:  Nehemiah Weller /Age/Sex: 1921  (80 y.o. female)   MRN & CSN:  [id number] & [id number] Admission Date/Time: 2021  5:48 PM   [facility] PCP: Agustina Sood, [address] Day: 2    Assessment and Plan:   Nehemiah Weller is a 80 y.o.  female  who presents with diarrhea    1) Acute on chronic colitis:  -CT abdomen/pelvis-diffuse mural thickening of the large bowel, especially the transverse region and on the left, compatible with nonspecific colitis. -GI disease panel, C. difficile ordered from ER.  -Continue antibiotics  -Gi on board; continue supportive care     2) Hypokalemia: Secondary to GI loss  -Replace and monitor with repeat BMP     3) WAGNER  -Creatinine 1.5, GFR 32  -Avoid nephrotoxic medication  -Continue IV fluids and repeat BMP     4) Physical debility  -PT OT to evaluate  -Case management consulted in ED    Other chronic medical conditions; medication resumed unless contraindicated   Abdominal aortic aneurysm: Measuring 5.7, distal descending thoracic aortic aneurysm  -Patient son aware-does not want any further work-up/intervention.     Ventral hernia: No surgery recommended     Essential hypertension-continue propranolol     Ménière's disease-continue meclizine as needed     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 ADLs       Diet ADULT DIET;  Full Liquid  Adult Oral Nutrition Supplement; Standard High Calorie/High Protein Oral Supplement   DVT Prophylaxis [] Lovenox, []  Heparin, [] SCDs, [] Ambulation   GI Prophylaxis [] PPI,  [] H2 Blocker,  [] Carafate,  [] Diet/Tube Feeds   Code Status Limited   Disposition TBD   MDM      History of Present Illness:     Patient was seen and examined  Hard of hearing  Denied any abdominal pain  No fever or chills    Ten point ROS reviewed negative, unless as noted above    Objective:   No intake or output data in the 24 hours ending 06/09/21 1104   Vitals:   Vitals:    06/09/21 1033   BP: 114/77   Pulse: 68   Resp: 14   Temp: 97.5 °F (36.4 °C)   SpO2: 90%     Physical Exam:   GEN Awake female, laying in bed in no apparent distress. Appears given age. EYES Pupils are equally round. No scleral erythema, discharge, or conjunctivitis. HENT Mucous membranes are moist. Oral pharynx without exudates, no evidence of thrush. NECK Supple, no apparent thyromegaly or masses. RESP Clear to auscultation, no wheezes, rales or rhonchi. Symmetric chest movement while on room air. CARDIO/VASC S1/S2 auscultated. Regular rate without appreciable murmurs, rubs, or gallops. No JVD or carotid bruits. Peripheral pulses equal bilaterally and palpable. No peripheral edema. GI Abdomen is soft without significant tenderness, masses, or guarding. Bowel sounds are normoactive. Rectal exam deferred.  No costovertebral angle tenderness. Alexandre catheter is not present. HEME/LYMPH No palpable cervical lymphadenopathy and no hepatosplenomegaly. No petechiae or ecchymoses. MSK No gross joint deformities. SKIN Normal coloration, warm, dry. NEURO no focal deficits  PSYCH Awake, alert. Affect appropriate.     Medications:   Medications:    sodium chloride flush  5-40 mL Intravenous 2 times per day    heparin (porcine)  5,000 Units Subcutaneous 3 times per day    [START ON 6/10/2021] ciprofloxacin  400 mg Intravenous Q24H    metroNIDAZOLE  500 mg Intravenous Q8H    aspirin  325 mg Oral Daily    levothyroxine  25 mcg Oral Daily    propranolol  20 mg Oral Daily    traZODone  50 mg Oral Nightly      Infusions:    sodium chloride      sodium chloride 75 mL/hr at 06/09/21 0248     PRN Meds: sodium chloride flush, 5-40 mL, PRN  sodium chloride, 25 mL, PRN  ondansetron, 4 mg, Q8H PRN   Or  ondansetron, 4 mg, Q6H PRN  polyethylene glycol, 17 g, Daily PRN  acetaminophen, 650 mg, Q6H PRN   Or  acetaminophen, 650 mg, Q6H PRN  potassium chloride, 10 mEq, PRN  meclizine, 25 mg, TID PRN          Electronically signed by Jose L Prado MD on 6/9/2021 at 11:04 AM

## 2021-06-09 NOTE — CONSULTS
[address] [address] Continence Nurse  Consult Note       Magda Berkowitz  AGE: 80 y.o. GENDER: female  : 1921  TODAY'S DATE:  2021    Subjective:     Reason for CWOCN Evaluation and Assessment: wound assessment      Magda Berkowitz is a 80 y.o. female referred by:   [x] Physician  [] Nursing  [] Other:     Wound Identification:  Wound Type: pressure and skin tear  Contributing Factors: chronic pressure, decreased mobility, anticoagulation therapy, incontinence of stool and incontinence of urine        PAST MEDICAL HISTORY        Diagnosis Date    COPD (chronic obstructive pulmonary disease) ([facility] [address]     Hypertension     Meniere's disease     DX with aneurysm of the heart last week. PAST SURGICAL HISTORY    Past Surgical History:   Procedure Laterality Date    APPENDECTOMY         FAMILY HISTORY    No family history on file. SOCIAL HISTORY    Social History     Tobacco Use    Smoking status: Never Smoker    Smokeless tobacco: Never Used   Substance Use Topics    Alcohol use: No    Drug use: No       ALLERGIES    No Known Allergies    MEDICATIONS    No current facility-administered medications on file prior to encounter.      Current Outpatient Medications on File Prior to Encounter   Medication Sig Dispense Refill    aspirin 325 MG tablet Take 325 mg by mouth daily      propranolol (INDERAL) 20 MG tablet Take 20 mg by mouth daily       meclizine (ANTIVERT) 25 MG tablet Take 25 mg by mouth 3 times daily as needed      furosemide (LASIX) 20 MG tablet Take 20 mg by mouth 2 times daily      traZODone (DESYREL) 50 MG tablet Take 50 mg by mouth nightly      levothyroxine (SYNTHROID) 25 MCG tablet Take 1 tablet by mouth Daily 30 tablet 3         Objective:      /66   Pulse 71   Temp 97.5 °F (36.4 °C) (Axillary)   Resp 14   Ht 5' 2\" (1.575 m)   Wt 109 lb (49.4 kg)   SpO2 91%   BMI 19.94 kg/m²   Andi Risk Score: Andi Scale Score: 12    LABS    CBC:   Lab Results Tunneling Position ___ O'Clock 0 06/09/21 1315   Undermining Starts ___ O'Clock 0 06/09/21 1315   Undermining Ends___ O'Clock 0 06/09/21 1315   Undermining Maxium Distance (cm) 0 06/09/21 1315   Wound Assessment Pink/red 06/09/21 1315   Drainage Amount Small 06/09/21 1315   Drainage Description Serosanguinous 06/09/21 1315   Odor None 06/09/21 1315   Karly-wound Assessment Blanchable erythema 06/09/21 1315   Margins Defined edges 06/09/21 1315   Wound Thickness Description not for Pressure Injury Partial thickness 06/09/21 1315   Number of days: 0       Wound 06/09/21 Elbow Right (Active)   Wound Etiology Skin Tear 06/09/21 1315   Dressing Status New dressing applied 06/09/21 1315   Wound Cleansed Cleansed with saline 06/09/21 1315   Dressing/Treatment Collagen;Silicone border 30/52/43 1315   Wound Length (cm) 2 cm 06/09/21 1315   Wound Width (cm) 1 cm 06/09/21 1315   Wound Depth (cm) 0.1 cm 06/09/21 1315   Wound Surface Area (cm^2) 2 cm^2 06/09/21 1315   Wound Volume (cm^3) 0.2 cm^3 06/09/21 1315   Distance Tunneling (cm) 0 cm 06/09/21 1315   Tunneling Position ___ O'Clock 0 06/09/21 1315   Undermining Starts ___ O'Clock 0 06/09/21 1315   Undermining Ends___ O'Clock 0 06/09/21 1315   Undermining Maxium Distance (cm) 0 06/09/21 1315   Wound Assessment Pink/red 06/09/21 1315   Drainage Amount Small 06/09/21 1315   Drainage Description Serosanguinous 06/09/21 1315   Odor None 06/09/21 1315   Karly-wound Assessment Ecchymosis;Fragile 06/09/21 1315   Margins Defined edges 06/09/21 1315   Wound Thickness Description not for Pressure Injury Partial thickness 06/09/21 1315   Number of days: 0       Wound 06/09/21 Pretibial Right;Lateral (Active)   Wound Etiology Skin Tear 06/09/21 1315   Dressing Status New dressing applied 06/09/21 1315   Wound Cleansed Cleansed with saline 06/09/21 1315   Dressing/Treatment Collagen;Silicone border 41/07/29 1315   Wound Length (cm) 1.4 cm 06/09/21 1315   Wound Width (cm) 1 cm 06/09/21 1315   Wound Depth (cm) 0.1 cm 06/09/21 1315   Wound Surface Area (cm^2) 1.4 cm^2 06/09/21 1315   Wound Volume (cm^3) 0.14 cm^3 06/09/21 1315   Distance Tunneling (cm) 0 cm 06/09/21 1315   Tunneling Position ___ O'Clock 0 06/09/21 1315   Undermining Starts ___ O'Clock 0 06/09/21 1315   Undermining Ends___ O'Clock 0 06/09/21 1315   Undermining Maxium Distance (cm) 0 06/09/21 1315   Wound Assessment Pink/red 06/09/21 1315   Drainage Amount Small 06/09/21 1315   Drainage Description Serosanguinous 06/09/21 1315   Odor None 06/09/21 1315   Karly-wound Assessment Ecchymosis;Fragile 06/09/21 1315   Margins Defined edges 06/09/21 1315   Wound Thickness Description not for Pressure Injury Partial thickness 06/09/21 1315   Number of days: 0       Wound 06/09/21 Pretibial Left;Lateral;Proximal (Active)   Wound Etiology Skin Tear 06/09/21 1315   Dressing Status New dressing applied 06/09/21 1315   Wound Cleansed Cleansed with saline 06/09/21 1315   Wound Length (cm) 2 cm 06/09/21 1315   Wound Width (cm) 1.5 cm 06/09/21 1315   Wound Depth (cm) 0.1 cm 06/09/21 1315   Wound Surface Area (cm^2) 3 cm^2 06/09/21 1315   Wound Volume (cm^3) 0.3 cm^3 06/09/21 1315   Distance Tunneling (cm) 0 cm 06/09/21 1315   Tunneling Position ___ O'Clock 0 06/09/21 1315   Undermining Starts ___ O'Clock 0 06/09/21 1315   Undermining Ends___ O'Clock 0 06/09/21 1315   Undermining Maxium Distance (cm) 0 06/09/21 1315   Wound Assessment Pink/red;Slough 06/09/21 1315   Drainage Amount Small 06/09/21 1315   Drainage Description Serosanguinous 06/09/21 1315   Odor None 06/09/21 1315   Karly-wound Assessment Fragile 06/09/21 1315   Margins Defined edges 06/09/21 1315   Wound Thickness Description not for Pressure Injury Full thickness 06/09/21 1315   Number of days: 0       Wound 06/09/21 Pretibial Left;Lateral;Distal (Active)   Wound Etiology Skin Tear 06/09/21 1315   Dressing Status New dressing applied 06/09/21 1315   Wound Cleansed Cleansed with saline 06/09/21 1315   Wound Length (cm) 0.4 cm 06/09/21 1315   Wound Width (cm) 0.4 cm 06/09/21 1315   Wound Depth (cm) 0.1 cm 06/09/21 1315   Wound Surface Area (cm^2) 0.16 cm^2 06/09/21 1315   Wound Volume (cm^3) 0.02 cm^3 06/09/21 1315   Distance Tunneling (cm) 0 cm 06/09/21 1315   Tunneling Position ___ O'Clock 0 06/09/21 1315   Undermining Starts ___ O'Clock 0 06/09/21 1315   Undermining Ends___ O'Clock 0 06/09/21 1315   Undermining Maxium Distance (cm) 0 06/09/21 1315   Wound Assessment Pink/red;Purple/maroon 06/09/21 1315   Drainage Amount Small 06/09/21 1315   Drainage Description Serosanguinous 06/09/21 1315   Odor None 06/09/21 1315   Karly-wound Assessment Ecchymosis;Fragile 06/09/21 1315   Margins Defined edges 06/09/21 1315   Wound Thickness Description not for Pressure Injury Partial thickness 06/09/21 1315   Number of days: 0       Wound 06/09/21 Pretibial Left; Anterior cluster (Active)   Wound Etiology Skin Tear 06/09/21 1315   Dressing Status New dressing applied 06/09/21 1315   Wound Cleansed Cleansed with saline 06/09/21 1315   Wound Length (cm) 5.8 cm 06/09/21 1315   Wound Width (cm) 3.2 cm 06/09/21 1315   Wound Depth (cm) 0.1 cm 06/09/21 1315   Wound Surface Area (cm^2) 18.56 cm^2 06/09/21 1315   Wound Volume (cm^3) 1.86 cm^3 06/09/21 1315   Distance Tunneling (cm) 0 cm 06/09/21 1315   Tunneling Position ___ O'Clock 0 06/09/21 1315   Undermining Starts ___ O'Clock 0 06/09/21 1315   Undermining Ends___ O'Clock 0 06/09/21 1315   Undermining Maxium Distance (cm) 0 06/09/21 1315   Wound Assessment Pink/red;Purple/maroon 06/09/21 1315   Drainage Amount Small 06/09/21 1315   Drainage Description Serosanguinous 06/09/21 1315   Odor None 06/09/21 1315   Karly-wound Assessment Ecchymosis;Fragile 06/09/21 1315   Margins Defined edges 06/09/21 1315   Wound Thickness Description not for Pressure Injury Full thickness 06/09/21 1315   Number of days: 0       Response to treatment:  Well tolerated by patient.      Pain Assessment:  Severity:  none  Quality of pain: na  Wound Pain Timing/Severity: na  Premedicated: no    Plan:     Plan of Care: Wound 06/09/21 Hip Left cluster-Dressing/Treatment: Collagen, Silicone border  Wound 06/09/21 Elbow Right-Dressing/Treatment: Collagen, Silicone border  Wound 06/09/21 Pretibial Right;Lateral-Dressing/Treatment: Collagen, Silicone border  Wound 06/09/21 Pretibial Left;Lateral;Proximal-Dressing/Treatment:  (Versatel, collagen, abd, kerlix)  Wound 06/09/21 Pretibial Left;Lateral;Distal-Dressing/Treatment:  (Versatel, collagen, abd, kerlix)  Wound 06/09/21 Pretibial Left; Anterior cluster-Dressing/Treatment:  (versatel, collagen, abd, kerlix)     Pt in bed. Son with pt. Stated he cares for pt at home and skin tears easily. Stated had pressure injury to right hip that has healed recently but now has pressure injury to left hip. Repositions pt at home. Skin tears noted to right elbow, right and left legs as above. Stage 2 cluster noted to left hip. Scar tissue noted to right hip-Mepilex for prevention reapplied. Pictures and measurements taken of wounds. Treatment applied as above. Sacrum and heels with blanchable erythema. Incontinent of stool and urine at times. Recommend moisture barrier. Atmos air pump applied to mattress. Positioned to left side with heels floated with pillow support. Pt is at high risk for skin breakdown AEB Andi. Follow Andi orders. Updated nurse. Specialty Bed Required : yes  [] Low Air Loss   [x] Pressure Redistribution  [] Fluid Immersion  [] Bariatric  [] Total Pressure Relief  [] Other:     Discharge Plan:  Placement for patient upon discharge: tbd  Hospice Care: no  Patient appropriate for Outpatient [address]: offered to son but wanted to try [address] first    Patient/Caregiver Teaching:  Level of patient/caregiver understanding able to: Son voiced understanding.         Electronically signed by Maeve Walton RN, Marla Zhao on 6/9/2021 at 2:39 PM

## 2021-06-09 NOTE — H&P
HISTORY AND PHYSICAL  (Hospitalist, Internal Medicine)  IDENTIFYING INFORMATION   PATIENT:  Katlin Ardon  MRN:  [id number]  ADMIT DATE: 6/8/2021      CHIEF COMPLAINT   Diarrhea, lethargy    HISTORY OF PRESENT ILLNESS   Katlin Ardon is a 80 y.o. female with hypertension, Ménière's disease, insomnia, chronic congestive heart failure, history of atrial flutter, abdominal aortic aneurysm, wheelchair dependent, dependent on most ADLs, was brought to ED by her son due to diarrhea for the last 2 weeks. He reported that patient has history of chronic diarrhea, but getting more frequently. Initially it was once a year, then became twice a year. This episode started 2 weeks ago. Patient's son tried liquid diet, which kind of resolved diarrhea initially but recurred. He also tried Pepto-Bismol, Imodium with no improvement. Reported multiple episodes of soft to watery diarrhea day and night. Denied noticing any blood. Patient has chronic abdominal pain and denied any worsening of the pain recently. No fever, chills, nausea, vomiting. Patient had very poor appetite. Today patient was very lethargic, not communicating much and lying in the couch most of the time which is not her baseline. Patient's son was consulted and brought her to the ED. Vitals at presentation-/78, HR 68, RR 15, temp 97.9, saturating 94% on room air. Lab work significant for potassium 2.9, chloride 97, CO2 25, BUN 1.5, magnesium 1.9, random glucose 108, troponin 0 0.030, hemoglobin 11.3. CT head-no acute process. CT abdomen/pelvis-diffuse mural thickening of the large bowel especially the transverse region and on the left-compatible with nonspecific colitis. Moderate to large ventral hernia with no evidence of obstruction, 5.7 abdominal aortic aneurysm. Patient received IV fluids, ciprofloxacin, metronidazole in ER.     PAST MEDICAL HISTORY PAST SURGICAL HISTORY   hypertension, Ménière's disease, insomnia, chronic congestive heart failure, history of atrial flutter, abdominal aortic aneurysm. Hysterectomy, appendectomy   FAMILY HISTORY SOCIAL HISTORY    Reviewed and noncontributory   no smoking, alcohol, illicit drug abuse   MEDICATIONS ALLERGIES   Patient's son provided a list of her medications-trazodone 50 mg nightly, Lasix 20 mg daily, levothyroxine 25 MCG daily, propranolol 20 mg daily, meclizine 25 mg 3 times daily as needed, aspirin 325 mg daily   no known drug allergies. PAST MEDICAL, SURGICAL, FAMILY, and SOCIAL HISTORY         Past Medical History:   Diagnosis Date    COPD (chronic obstructive pulmonary disease) ([facility] [address]     Hypertension     Meniere's disease     DX with aneurysm of the heart last week. Past Surgical History:   Procedure Laterality Date    APPENDECTOMY       No family history on file. Family Hx of HTN  Family Hx as reviewed above, otherwise non-contributory  Social History     Socioeconomic History    Marital status:      Spouse name: Not on file    Number of children: Not on file    Years of education: Not on file    Highest education level: Not on file   Occupational History    Not on file   Tobacco Use    Smoking status: Never Smoker    Smokeless tobacco: Never Used   Substance and Sexual Activity    Alcohol use: No    Drug use: No    Sexual activity: Not Currently   Other Topics Concern    Not on file   Social History Narrative    Not on file     Social Determinants of Health     Financial Resource Strain:     Difficulty of Paying Living Expenses:    Food Insecurity:     Worried About Running Out of Food in the Last Year:     [address] in the Last Year:    Transportation Needs:     Lack of Transportation (Medical):      Lack of Transportation (Non-Medical):    Physical Activity:     Days of Exercise per Week:     Minutes of Exercise per Session:    Stress:     Feeling of Stress :    Social Connections:     Frequency of Communication with Friends and Family:     Frequency of Social Gatherings with Friends and Family:     Attends [employer] Services:     Active Member of Clubs or Organizations:     Attends Club or Organization Meetings:     Marital Status:    Intimate Partner Violence:     Fear of Current or Ex-Partner:     Emotionally Abused:     Physically Abused:     Sexually Abused:        MEDICATIONS   Medications Prior to Admission  Not in a hospital admission. Current Medications  Current Facility-Administered Medications   Medication Dose Route Frequency Provider Last Rate Last Admin    ciprofloxacin (CIPRO) IVPB 400 mg  400 mg Intravenous Once Michelle Whitfield MD        metronidazole (FLAGYL) 500 mg in NaCl 100 mL IVPB premix  500 mg Intravenous Once Michelle Whitfield MD         Current Outpatient Medications   Medication Sig Dispense Refill    aspirin 325 MG tablet Take 325 mg by mouth daily      LORazepam (ATIVAN) 0.5 MG tablet Take 0.5 mg by mouth every 6 hours as needed for Anxiety. [employer]ash propranolol (INDERAL) 20 MG tablet Take 20 mg by mouth 3 times daily      meclizine (ANTIVERT) 25 MG tablet Take 25 mg by mouth 3 times daily as needed      furosemide (LASIX) 20 MG tablet Take 20 mg by mouth 2 times daily      traZODone (DESYREL) 50 MG tablet Take 50 mg by mouth nightly      levothyroxine (SYNTHROID) 25 MCG tablet Take 1 tablet by mouth Daily 30 tablet 3         Allergies  No Known Allergies    REVIEW OF SYSTEMS   Patient is very hard of hearing. Was difficult to obtain review of systems. PHYSICAL EXAM     Wt Readings from Last 3 Encounters:   06/08/21 109 lb (49.4 kg)   02/13/19 109 lb (49.4 kg)   02/06/18 109 lb 6.4 oz (49.6 kg)       Blood pressure 120/78, pulse 68, temperature 97.9 °F (36.6 °C), resp. rate 15, height 5' 2\" (1.575 m), weight 109 lb (49.4 kg), SpO2 94 %, not currently breastfeeding.     GEN  -Awake, alert, NAD.   EYES 27.7   MCHC Latest Ref Range: 32.0 - 36.0 % 31.0 (L)   MPV Latest Ref Range: 7.5 - 11.1 FL 10.6   RDW Latest Ref Range: 11.7 - 14.9 % 14.7   Platelet Count Latest Ref Range: 140 - 440 K/CU    Lymphocyte % Latest Ref Range: 24 - 44 % 8.1 (L)   Monocytes % Latest Ref Range: 0 - 4 % 12.5 (H)   Eosinophils % Latest Ref Range: 0 - 3 % 1.2   Basophils % Latest Ref Range: 0 - 1 % 0.3   Lymphocytes Absolute Latest Units: K/CU MM 0.7   Monocytes Absolute Latest Units: K/CU MM 1.1   Eosinophils Absolute Latest Units: K/CU MM 0.1   Basophils Absolute Latest Units: K/CU MM 0.0   Differential Type Unknown AUTOMATED DIFFERENTIAL   Segs Relative Latest Ref Range: 36 - 66 % 77.5 (H)   Segs Absolute Latest Units: K/CU MM 7.0   Nucleated RBC % Latest Units: % 0.0   Immature Neutrophil % Latest Ref Range: 0 - 0.43 % 0.4   Total Immature Neutrophil Latest Units: K/CU MM 0.04   Total Nucleated RBC Latest Units: K/CU MM 0.0     Recent Imaging    CT ABDOMEN PELVIS WO CONTRAST Additional Contrast? None [[id number]] Collected: 06/08/21 2058      Order Status: Completed Updated: 06/08/21 2108     Narrative:       EXAMINATION:   CT OF THE ABDOMEN AND PELVIS WITHOUT CONTRAST 6/8/2021 5:18 pm     TECHNIQUE:   CT of the abdomen and pelvis was performed without the administration of   intravenous contrast. Multiplanar reformatted images are provided for review. Dose modulation, iterative reconstruction, and/or weight based adjustment of   the mA/kV was utilized to reduce the radiation dose to as low as reasonably   achievable.      COMPARISON:   01/21/2018     HISTORY:   ORDERING SYSTEM PROVIDED HISTORY: diarrhea, WAGNER   TECHNOLOGIST PROVIDED HISTORY:   Reason for exam:->diarrhea, WAGNER   Additional Contrast?->None   Decision Support Exception - unselect if not a suspected or confirmed   emergency medical condition->Emergency Medical Condition (MA)   Reason for Exam: failure to thrive,reduce intake of food,diarrhea for   months,WAGNER   Acuity: no   specific follow-up.  No free pelvic fluid is found.  Urinary bladder   unremarkable. Peritoneum/Retroperitoneum: The aneurysm of the thoracic aorta extends into   the abdomen,, with the abdominal aorta measuring 3.8 cm at the level of the   left renal vein.  Again, no evidence of leakage.  No retroperitoneal   lymphadenopathy is identified. Bones/Soft Tissues: Chronic compression fracture involving the superior   endplate of L1 noted.  No acute bony abnormalities are identified.      Impression:       Diffuse mural thickening of the large bowel, especially the transverse region   and on the left, compatible with nonspecific colitis. There is a moderate to large ventral wall hernia just to the right of the   rectus abdominus muscle containing a loop of large bowel.  No evidence of   obstruction is seen, however. Very large distal descending thoracic aortic aneurysm, which extends into the   upper abdomen.  Nonemergent vascular surgical consultation may be considered,   based upon the patient's life expectancy and comorbidities.  See full   recommendations below. RECOMMENDATIONS:   5.7 cm abdominal aortic aneurysm. Recommend referral to a vascular specialist.   Reference: J Am Cameron Radiol [id number].      CT HEAD WO CONTRAST [[id number]] Collected: 06/08/21 2055     Order Status: Completed Updated: 06/08/21 2101     Narrative:       EXAMINATION:   CT OF THE HEAD WITHOUT CONTRAST  6/8/2021 8:17 pm     TECHNIQUE:   CT of the head was performed without the administration of intravenous   contrast. Dose modulation, iterative reconstruction, and/or weight based   adjustment of the mA/kV was utilized to reduce the radiation dose to as low   as reasonably achievable. COMPARISON:   11/04/2016     HISTORY:   ORDERING SYSTEM PROVIDED HISTORY: lethargy   TECHNOLOGIST PROVIDED HISTORY:   Reason for exam:->lethargy   Has a \"code stroke\" or \"stroke alert\" been called? ->No   Decision Support Exception - unselect if not a suspected or confirmed   emergency medical condition->Emergency Medical Condition (MA)   Reason for Exam: lethargy   Acuity: Unknown   Type of Exam: Unknown   Additional signs and symptoms: failure to thrive     FINDINGS:   BRAIN/VENTRICLES: There is age-appropriate atrophy and there is moderate to   severe patchy periventricular and subcortical white matter low attenuation   that is nonspecific but most consistent with chronic small vessel ischemia. There are bilateral remote basal ganglia lacunar infarcts including a remote   right thalamic lacunar infarct.  There is no evidence of acute hemorrhage,   mass or extra-axial fluid collection.  Bulky calcified plaque in the   intracranial internal carotid arteries. ORBITS: The visualized portion of the orbits demonstrate no acute abnormality. SINUSES: The visualized paranasal sinuses and mastoid air cells demonstrate   no acute abnormality. SOFT TISSUES/SKULL:  No acute abnormality of the visualized skull or soft   tissues.      Impression:       No acute intracranial abnormality. Generalized atrophy.  Chronic ischemic changes as above.      CT ABDOMEN PELVIS W IV CONTRAST Additional Contrast? None [[id number]]      Order Status: Canceled          Relevant labs and imaging reviewed    ASSESSMENT AND PLAN     #. Colitis:  -CT abdomen/pelvis-diffuse mural thickening of the large bowel, especially the transverse region and on the left, compatible with nonspecific colitis. -GI disease panel, C. difficile ordered from ER.  -Patient received ciprofloxacin, Flagyl  -We will continue with ciprofloxacin, Flagyl  -Consult GI    #. Hypokalemia: Secondary to GI loss  -Replace and monitor with repeat BMP    #.  Mild renal insufficiency on CKD:  -Creatinine 1.5, GFR 32  -Continue IV fluids and repeat BMP    #. Failure to thrive.  -Case management consulted in ED    #.   Abdominal aortic aneurysm: Measuring 5.7, distal descending

## 2021-06-10 LAB
ALBUMIN SERPL-MCNC: 2.7 GM/DL (ref 3.4–5)
ALP BLD-CCNC: 78 IU/L (ref 40–128)
ALT SERPL-CCNC: <5 U/L (ref 10–40)
AMYLASE: 25 U/L (ref 25–115)
ANION GAP SERPL CALCULATED.3IONS-SCNC: 11 MMOL/L (ref 4–16)
AST SERPL-CCNC: 7 IU/L (ref 15–37)
BASOPHILS ABSOLUTE: 0 K/CU MM
BASOPHILS RELATIVE PERCENT: 0.6 % (ref 0–1)
BILIRUB SERPL-MCNC: 0.2 MG/DL (ref 0–1)
BUN BLDV-MCNC: 16 MG/DL (ref 6–23)
CALCIUM SERPL-MCNC: 8 MG/DL (ref 8.3–10.6)
CHLORIDE BLD-SCNC: 106 MMOL/L (ref 99–110)
CO2: 21 MMOL/L (ref 21–32)
CREAT SERPL-MCNC: 0.9 MG/DL (ref 0.6–1.1)
DIFFERENTIAL TYPE: ABNORMAL
EOSINOPHILS ABSOLUTE: 0.1 K/CU MM
EOSINOPHILS RELATIVE PERCENT: 0.9 % (ref 0–3)
GFR AFRICAN AMERICAN: >60 ML/MIN/1.73M2
GFR NON-AFRICAN AMERICAN: 58 ML/MIN/1.73M2
GLUCOSE BLD-MCNC: 108 MG/DL (ref 70–99)
HCT VFR BLD CALC: 33.4 % (ref 37–47)
HEMOGLOBIN: 9.7 GM/DL (ref 12.5–16)
IMMATURE NEUTROPHIL %: 0.6 % (ref 0–0.43)
LIPASE: 12 IU/L (ref 13–60)
LYMPHOCYTES ABSOLUTE: 0.5 K/CU MM
LYMPHOCYTES RELATIVE PERCENT: 7.9 % (ref 24–44)
MCH RBC QN AUTO: 27.4 PG (ref 27–31)
MCHC RBC AUTO-ENTMCNC: 29 % (ref 32–36)
MCV RBC AUTO: 94.4 FL (ref 78–100)
MONOCYTES ABSOLUTE: 0.9 K/CU MM
MONOCYTES RELATIVE PERCENT: 14.2 % (ref 0–4)
NUCLEATED RBC %: 0 %
PDW BLD-RTO: 14.9 % (ref 11.7–14.9)
PLATELET # BLD: 222 K/CU MM (ref 140–440)
PMV BLD AUTO: 10.4 FL (ref 7.5–11.1)
POTASSIUM SERPL-SCNC: 3.4 MMOL/L (ref 3.5–5.1)
RBC # BLD: 3.54 M/CU MM (ref 4.2–5.4)
SEGMENTED NEUTROPHILS ABSOLUTE COUNT: 4.8 K/CU MM
SEGMENTED NEUTROPHILS RELATIVE PERCENT: 75.8 % (ref 36–66)
SODIUM BLD-SCNC: 138 MMOL/L (ref 135–145)
TOTAL IMMATURE NEUTOROPHIL: 0.04 K/CU MM
TOTAL NUCLEATED RBC: 0 K/CU MM
TOTAL PROTEIN: 5.1 GM/DL (ref 6.4–8.2)
WBC # BLD: 6.3 K/CU MM (ref 4–10.5)

## 2021-06-10 PROCEDURE — 36415 COLL VENOUS BLD VENIPUNCTURE: CPT

## 2021-06-10 PROCEDURE — 76937 US GUIDE VASCULAR ACCESS: CPT

## 2021-06-10 PROCEDURE — C1751 CATH, INF, PER/CENT/MIDLINE: HCPCS

## 2021-06-10 PROCEDURE — 83690 ASSAY OF LIPASE: CPT

## 2021-06-10 PROCEDURE — 82150 ASSAY OF AMYLASE: CPT

## 2021-06-10 PROCEDURE — 6360000002 HC RX W HCPCS: Performed by: INTERNAL MEDICINE

## 2021-06-10 PROCEDURE — 94761 N-INVAS EAR/PLS OXIMETRY MLT: CPT

## 2021-06-10 PROCEDURE — 1200000000 HC SEMI PRIVATE

## 2021-06-10 PROCEDURE — 85025 COMPLETE CBC W/AUTO DIFF WBC: CPT

## 2021-06-10 PROCEDURE — 2500000003 HC RX 250 WO HCPCS: Performed by: INTERNAL MEDICINE

## 2021-06-10 PROCEDURE — 05HB33Z INSERTION OF INFUSION DEVICE INTO RIGHT BASILIC VEIN, PERCUTANEOUS APPROACH: ICD-10-PCS | Performed by: INTERNAL MEDICINE

## 2021-06-10 PROCEDURE — 6370000000 HC RX 637 (ALT 250 FOR IP): Performed by: INTERNAL MEDICINE

## 2021-06-10 PROCEDURE — 36410 VNPNXR 3YR/> PHY/QHP DX/THER: CPT

## 2021-06-10 PROCEDURE — 80053 COMPREHEN METABOLIC PANEL: CPT

## 2021-06-10 RX ORDER — MAGNESIUM SULFATE IN WATER 40 MG/ML
2000 INJECTION, SOLUTION INTRAVENOUS ONCE
Status: COMPLETED | OUTPATIENT
Start: 2021-06-10 | End: 2021-06-10

## 2021-06-10 RX ADMIN — METRONIDAZOLE 500 MG: 500 INJECTION, SOLUTION INTRAVENOUS at 07:41

## 2021-06-10 RX ADMIN — CIPROFLOXACIN 400 MG: 2 INJECTION, SOLUTION INTRAVENOUS at 02:23

## 2021-06-10 RX ADMIN — HEPARIN SODIUM 5000 UNITS: 5000 INJECTION INTRAVENOUS; SUBCUTANEOUS at 21:02

## 2021-06-10 RX ADMIN — METRONIDAZOLE 500 MG: 500 INJECTION, SOLUTION INTRAVENOUS at 23:38

## 2021-06-10 RX ADMIN — TRAZODONE HYDROCHLORIDE 50 MG: 50 TABLET ORAL at 21:02

## 2021-06-10 RX ADMIN — METRONIDAZOLE 500 MG: 500 INJECTION, SOLUTION INTRAVENOUS at 02:24

## 2021-06-10 RX ADMIN — METRONIDAZOLE 500 MG: 500 INJECTION, SOLUTION INTRAVENOUS at 16:30

## 2021-06-10 RX ADMIN — MAGNESIUM SULFATE IN WATER 2000 MG: 40 INJECTION, SOLUTION INTRAVENOUS at 09:25

## 2021-06-10 RX ADMIN — HEPARIN SODIUM 5000 UNITS: 5000 INJECTION INTRAVENOUS; SUBCUTANEOUS at 06:35

## 2021-06-10 RX ADMIN — PROPRANOLOL HYDROCHLORIDE 20 MG: 10 TABLET ORAL at 07:41

## 2021-06-10 RX ADMIN — LEVOTHYROXINE SODIUM 25 MCG: 25 TABLET ORAL at 06:35

## 2021-06-10 RX ADMIN — ASPIRIN 325 MG ORAL TABLET 325 MG: 325 PILL ORAL at 07:41

## 2021-06-10 NOTE — DISCHARGE INSTR - COC
Continuity of Care Form    Patient Name: Hien Rodirguez   :  1921  MRN:  [id number]    Admit date:  2021  Discharge date:  06/15/2021    Code Status Order: Limited   Advance Directives:   [facility] Documentation       Date/Time Healthcare Directive Type of Healthcare Directive Copy in [address] [place] [address] [address] Agent's Name Healthcare Agent's Phone Number    21 0840  No, patient does not have an advance directive for healthcare treatment -- -- -- -- --            Admitting Physician:  Janet Diego MD  PCP: Dannis Phoenix, MD    Discharging Nurse: Qi Pascual  [address] Unit/Room#: 4104/4104-A  Discharging Unit Phone Number: [phone]    Emergency Contact:   Extended Emergency Contact Information  Primary Emergency Contact: [address] Phone: [phone]  Mobile Phone: [phone]  Relation: Child    Past Surgical History:  Past Surgical History:   Procedure Laterality Date    APPENDECTOMY         Immunization History:   Immunization History   Administered Date(s) Administered    COVID-19, Philip Sands, PF, 30mcg/0.3mL 2021, 2021    Influenza, Quadv, IM, PF (6 mo and older Fluzone, Flulaval, Fluarix, and 3 yrs and older Afluria) 2016, 2018    Pneumococcal Conjugate 13-valent (Yvttxxv70) 2016    Pneumococcal Polysaccharide (Vfyabyexg58) 2018    Tdap (Boostrix, Adacel) 10/28/2015       Active Problems:  Patient Active Problem List   Diagnosis Code    CHF (congestive heart failure) ([facility] I50.9    SOB (shortness of breath) R06.02    COPD (chronic obstructive pulmonary disease) [facility] J44.9    Essential hypertension I10    TIA (transient ischemic attack) G45.9    Diarrhea R19.7    Colitis K52.9    Dehydration, moderate E86.0    Gait disturbance R26.9    Leg weakness, bilateral R29.898    Other specified abdominal hernia without obstruction or gangrene K45.8       Isolation/Infection: Isolation            No Isolation          Patient Infection Status       Infection Onset Added Last Indicated Last Indicated By Review Planned Expiration Resolved Resolved By    None active    Resolved    C-diff Rule Out 06/08/21 06/08/21 06/08/21 C difficile Molecular/PCR (Ordered)   06/11/21 David Marti LPN            Nurse Assessment:  Last Vital Signs: BP (!) 133/95   Pulse 97   Temp 98.1 °F (36.7 °C) (Oral)   Resp 14   Ht 5' 2.01\" (1.575 m)   Wt 108 lb 8 oz (49.2 kg)   SpO2 92%   BMI 19.84 kg/m²     Last documented pain score (0-10 scale): Pain Level: 0  Last Weight:   Wt Readings from Last 1 Encounters:   06/15/21 108 lb 8 oz (49.2 kg)     Mental Status:  {IP PT MENTAL STATUS:20030}    IV Access:  - None    Nursing Mobility/ADLs:  Walking   Dependent  Transfer  Dependent  Bathing  Dependent  Dressing  Dependent  Toileting  Dependent  Feeding  Assisted  Med Admin  Assisted  Med Delivery   whole    Wound Care Documentation and Therapy:  Wound 11/07/16 Right Lateral distal leg (Active)   Number of days: 1681       Wound 06/09/21 Hip Left cluster (Active)   Wound Etiology Pressure Stage  2 06/15/21 0736   Dressing Status Clean;Dry; Intact 06/15/21 0736   Wound Cleansed Cleansed with saline 06/15/21 0736   Dressing/Treatment Collagen;Silicone border 04/83/48 0736   Dressing Change Due 06/12/21 06/15/21 0736   Wound Length (cm) 3.5 cm 06/09/21 1315   Wound Width (cm) 3.5 cm 06/09/21 1315   Wound Depth (cm) 0.1 cm 06/09/21 1315   Wound Surface Area (cm^2) 12.25 cm^2 06/09/21 1315   Wound Volume (cm^3) 1.23 cm^3 06/09/21 1315   Distance Tunneling (cm) 0 cm 06/15/21 0736   Tunneling Position ___ O'Clock 0 06/15/21 0736   Undermining Starts ___ O'Clock 0 06/15/21 0736   Undermining Ends___ O'Clock 0 06/15/21 0736   Undermining Maxium Distance (cm) 0 06/15/21 0736   Wound Assessment Pink/red 06/15/21 0736   Drainage Amount Small 06/15/21 0736   Drainage Description Serosanguinous 06/15/21 0736   Odor None ___ O'Clock 0 06/15/21 0736   Undermining Ends___ O'Clock 0 06/15/21 0736   Undermining Maxium Distance (cm) 0 06/15/21 0736   Wound Assessment Pink/red 06/15/21 0736   Drainage Amount Small 06/15/21 0736   Drainage Description Serosanguinous 06/15/21 0736   Odor None 06/15/21 0736   Karly-wound Assessment Ecchymosis;Fragile 06/15/21 0736   Margins Defined edges 06/15/21 0736   Wound Thickness Description not for Pressure Injury Partial thickness 06/15/21 0736   Number of days: 6       Wound 06/09/21 Pretibial Left;Lateral;Proximal (Active)   Wound Etiology Skin Tear 06/15/21 0736   Dressing Status Clean; Intact;Dry 06/15/21 0736   Wound Cleansed Cleansed with saline 06/15/21 0736   Dressing Change Due 06/12/21 06/15/21 0736   Wound Length (cm) 2 cm 06/09/21 1315   Wound Width (cm) 1.5 cm 06/09/21 1315   Wound Depth (cm) 0.1 cm 06/09/21 1315   Wound Surface Area (cm^2) 3 cm^2 06/09/21 1315   Wound Volume (cm^3) 0.3 cm^3 06/09/21 1315   Distance Tunneling (cm) 0 cm 06/15/21 0736   Tunneling Position ___ O'Clock 0 06/15/21 0736   Undermining Starts ___ O'Clock 0 06/15/21 0736   Undermining Ends___ O'Clock 0 06/15/21 0736   Undermining Maxium Distance (cm) 0 06/15/21 0736   Wound Assessment Pink/red;Slough 06/15/21 0736   Drainage Amount Small 06/15/21 0736   Drainage Description Serosanguinous 06/15/21 0736   Odor None 06/15/21 0736   Karly-wound Assessment Fragile 06/15/21 0736   Margins Defined edges 06/15/21 0736   Wound Thickness Description not for Pressure Injury Full thickness 06/15/21 0736   Number of days: 6       Wound 06/09/21 Pretibial Left;Lateral;Distal (Active)   Wound Etiology Skin Tear 06/15/21 0736   Dressing Status Clean;Dry; Intact 06/15/21 0736   Wound Cleansed Cleansed with saline 06/15/21 0736   Dressing Change Due 06/12/21 06/15/21 0736   Wound Length (cm) 0.4 cm 06/09/21 1315   Wound Width (cm) 0.4 cm 06/09/21 1315   Wound Depth (cm) 0.1 cm 06/09/21 1315   Wound Surface Area (cm^2) 0.16 cm^2 BM: 06/14/2021    Intake/Output Summary (Last 24 hours) at 6/15/2021 1403  Last data filed at 6/15/2021 0928  Gross per 24 hour   Intake 180 ml   Output    Net 180 ml     I/O last 3 completed shifts: In: 79 [P.O.:60; I.V.:10]  Out: -     Safety Concerns: At Risk for Falls    Impairments/Disabilities:      None, Vision and Hearing    Nutrition Therapy:  Current Nutrition Therapy:   - Oral Diet:  General    Routes of Feeding: Oral  Liquids: No Restrictions  Daily Fluid Restriction: no  Last Modified Barium Swallow with Video (Video Swallowing Test): not done    Treatments at the Time of Hospital Discharge:   Respiratory Treatments: None  Oxygen Therapy:  is not on home oxygen therapy. Ventilator:    - No ventilator support    Rehab Therapies: Physical Therapy  Weight Bearing Status/Restrictions: No weight bearing restirctions  Other Medical Equipment (for information only, NOT a DME order):  hospital bed  Other Treatments:     Patient's personal belongings (please select all that are sent with patient):  Glasses, Hearing Aides right    RN SIGNATURE:  Electronically signed by Jamarcus Purcell RN on 6/15/21 at 2:33 PM EDT    CASE MANAGEMENT/SOCIAL WORK SECTION    Inpatient Status Date: ***    Readmission Risk Assessment Score:  Readmission Risk              Risk of Unplanned Readmission:  13           Discharging to Facility/ Agency   · Name:   · Address:  · Phone:  · Fax:    Dialysis Facility (if applicable)   · Name:  · Address:  · Dialysis Schedule:  · Phone:  · Fax:    / signature: {Esignature:[id number]}    PHYSICIAN SECTION    Prognosis: Fair    Condition at Discharge: Stable    Rehab Potential (if transferring to Rehab):  Fair    Recommended Labs or Other Treatments After Discharge: None    Physician Certification: I certify the above information and transfer of Neil Lew  is necessary for the continuing treatment of the diagnosis listed and that she requires Skilled Nursing Facility for greater 30 days.      Update Admission H&P: No change in H&P    PHYSICIAN SIGNATURE:  Electronically signed by NGUYEN Juarez on 6/15/21 at 2:19 PM EDT

## 2021-06-10 NOTE — CARE COORDINATION
CM reviewed notes from PT/OT. CM called pts son and left a VM to discuss options for discharge. LH  1100 CM spoke with son and regarding discharge needs. CM discussed SNF vs home care. Son informed CM that he wants to take pt home and will be with her 24/7. Son would like [place] home care to follow. 1411 CM called [place] and spoke with [facility] with referral. Marnie Phalen to do a start of care until Monday due to staffing. CM called [address] [place] [address] Rd with referral and faxed information.  [address]

## 2021-06-10 NOTE — PROGRESS NOTES
Hospitalist Progress Note      Name:  Nehemiah Weller /Age/Sex: 1921  (80 y.o. female)   MRN & CSN:  [id number] & [id number] Admission Date/Time: 2021  5:48 PM   Location:  [address] [place] [address]- PCP: Agustina Sood MD         Hospital Day: 3    Assessment and Plan:   Nehemiah Weller is a 80 y.o.  female  who presents with diarrhea     1) Acute on chronic colitis:  -CT abdomen/pelvis-diffuse mural thickening of the large bowel, especially the transverse region and on the left, compatible with nonspecific colitis. -GI disease panel, C. difficile ordered from ER.  -Continue antibiotics  -Gi on board; continue supportive care     2) Hypokalemia: Secondary to GI loss  -Replace and monitor with repeat BMP     3) WAGNER  -Creatinine 1.5, GFR 32  -Avoid nephrotoxic medication  -Continue IV fluids and repeat BMP     4) Physical debility  -PT OT to evaluate  -Case management consulted     Other chronic medical conditions; medication resumed unless contraindicated   Abdominal aortic aneurysm: Measuring 5.7, distal descending thoracic aortic aneurysm  -Patient son aware-does not want any further work-up/intervention.     Ventral hernia: No surgery recommended     Essential hypertension-continue propranolol     Ménière's disease-continue meclizine as needed     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 ADLs    Hard of hearing       Diet ADULT DIET;  Full Liquid  Adult Oral Nutrition Supplement; Standard High Calorie/High Protein Oral Supplement   DVT Prophylaxis [] Lovenox, []  Heparin, [] SCDs, [] Ambulation   GI Prophylaxis [] PPI,  [] H2 Blocker,  [] Carafate,  [] Diet/Tube Feeds   Code Status Limited   Disposition TBD   MDM      History of Present Illness:     Patient was seen and examined  Hard of hearing  No fever no chills

## 2021-06-11 LAB
ANION GAP SERPL CALCULATED.3IONS-SCNC: 10 MMOL/L (ref 4–16)
BUN BLDV-MCNC: 11 MG/DL (ref 6–23)
CALCIUM SERPL-MCNC: 8 MG/DL (ref 8.3–10.6)
CHLORIDE BLD-SCNC: 107 MMOL/L (ref 99–110)
CO2: 24 MMOL/L (ref 21–32)
CREAT SERPL-MCNC: 0.8 MG/DL (ref 0.6–1.1)
GFR AFRICAN AMERICAN: >60 ML/MIN/1.73M2
GFR NON-AFRICAN AMERICAN: >60 ML/MIN/1.73M2
GLUCOSE BLD-MCNC: 111 MG/DL (ref 70–99)
MAGNESIUM: 2.2 MG/DL (ref 1.8–2.4)
POTASSIUM SERPL-SCNC: 3.5 MMOL/L (ref 3.5–5.1)
SODIUM BLD-SCNC: 141 MMOL/L (ref 135–145)

## 2021-06-11 PROCEDURE — 2500000003 HC RX 250 WO HCPCS: Performed by: INTERNAL MEDICINE

## 2021-06-11 PROCEDURE — 80048 BASIC METABOLIC PNL TOTAL CA: CPT

## 2021-06-11 PROCEDURE — 97530 THERAPEUTIC ACTIVITIES: CPT

## 2021-06-11 PROCEDURE — 94761 N-INVAS EAR/PLS OXIMETRY MLT: CPT

## 2021-06-11 PROCEDURE — 36415 COLL VENOUS BLD VENIPUNCTURE: CPT

## 2021-06-11 PROCEDURE — 83735 ASSAY OF MAGNESIUM: CPT

## 2021-06-11 PROCEDURE — 6360000002 HC RX W HCPCS: Performed by: INTERNAL MEDICINE

## 2021-06-11 PROCEDURE — 6370000000 HC RX 637 (ALT 250 FOR IP): Performed by: INTERNAL MEDICINE

## 2021-06-11 PROCEDURE — 1200000000 HC SEMI PRIVATE

## 2021-06-11 PROCEDURE — 2580000003 HC RX 258: Performed by: INTERNAL MEDICINE

## 2021-06-11 RX ADMIN — SODIUM CHLORIDE, PRESERVATIVE FREE 10 ML: 5 INJECTION INTRAVENOUS at 20:27

## 2021-06-11 RX ADMIN — ASPIRIN 325 MG ORAL TABLET 325 MG: 325 PILL ORAL at 08:52

## 2021-06-11 RX ADMIN — SODIUM CHLORIDE: 9 INJECTION, SOLUTION INTRAVENOUS at 23:30

## 2021-06-11 RX ADMIN — METRONIDAZOLE 500 MG: 500 INJECTION, SOLUTION INTRAVENOUS at 16:03

## 2021-06-11 RX ADMIN — SODIUM CHLORIDE: 9 INJECTION, SOLUTION INTRAVENOUS at 06:14

## 2021-06-11 RX ADMIN — CIPROFLOXACIN 400 MG: 2 INJECTION, SOLUTION INTRAVENOUS at 00:58

## 2021-06-11 RX ADMIN — HEPARIN SODIUM 5000 UNITS: 5000 INJECTION INTRAVENOUS; SUBCUTANEOUS at 21:45

## 2021-06-11 RX ADMIN — LEVOTHYROXINE SODIUM 25 MCG: 25 TABLET ORAL at 05:45

## 2021-06-11 RX ADMIN — PROPRANOLOL HYDROCHLORIDE 20 MG: 10 TABLET ORAL at 08:52

## 2021-06-11 RX ADMIN — METRONIDAZOLE 500 MG: 500 INJECTION, SOLUTION INTRAVENOUS at 08:52

## 2021-06-11 RX ADMIN — HEPARIN SODIUM 5000 UNITS: 5000 INJECTION INTRAVENOUS; SUBCUTANEOUS at 13:53

## 2021-06-11 RX ADMIN — TRAZODONE HYDROCHLORIDE 50 MG: 50 TABLET ORAL at 20:24

## 2021-06-11 RX ADMIN — HEPARIN SODIUM 5000 UNITS: 5000 INJECTION INTRAVENOUS; SUBCUTANEOUS at 05:49

## 2021-06-11 ASSESSMENT — PAIN SCALES - GENERAL
PAINLEVEL_OUTOF10: 0
PAINLEVEL_OUTOF10: 0

## 2021-06-11 NOTE — CONSULTS
Consult reviewed with primary nurse. Education regarding placement of Extended Dwell catheter discussed with nurse and patient along with benefits and risks. Consent given by nurse and patient. Arrow Endurance Extended Dwell 20g catheter placed in JENELLE using sterile ultrasound-guided technique. Placement verified via ultrasound visualization of catheter within the vessel lumen. Catheter returns blood briskly and flushes with ease and no abnormalities noted. Patient tolerated well. Please consult IV/PICC Team if patient's needs change.

## 2021-06-11 NOTE — PROGRESS NOTES
Comprehensive Nutrition Assessment    Type and Reason for Visit:  Reassess    Nutrition Recommendations/Plan:   · Continue current diet and supplements as order   · Add Kefir and snack qd at breakfast   · Encourage po intake, please assist with feeding as needed     Nutrition Assessment:  Patient advanced diet to regular per GI. Patient was asleep during visit. Per chart review, pt had loss of appetite for the last few days, diarrhea. Unable to perform NFPE due to blankets. Will re-attempt at reassess. Last BM noted 6/10. Will trial Coworking[employer]. Pt at high nutrition risk. Malnutrition Assessment:  Malnutrition Status:  Insufficient data    Context:  Chronic Illness       Estimated Daily Nutrient Needs:  Energy (kcal):  2303-1331 (30-35 kcal/kg); Weight Used for Energy Requirements:  Current     Protein (g):  50-60 (1-1.2 g/kg IBW); Weight Used for Protein Requirements:  Ideal        Fluid (ml/day):  9224-0781; Method Used for Fluid Requirements:  1 ml/kcal      Nutrition Related Findings:  May need possible flexible sigmoidscopy per physician    Wounds:  Multiple, Pressure Injury, Stage II, Skin Tears       Current Nutrition Therapies:    Adult Oral Nutrition Supplement; Standard High Calorie/High Protein Oral Supplement  ADULT DIET;  Regular    Anthropometric Measures:  · Height: 5' 2\" (157.5 cm)  · Current Body Weight: 108 lb 0.4 oz (49 kg)   · Admission Body Weight:  (?)    · Usual Body Weight: 114 lb 10.2 oz (52 kg) (2/5/18)     · Ideal Body Weight: 110 lbs; % Ideal Body Weight 98.2 %   · BMI: 19.8  · BMI Categories: Underweight (BMI less than 22) age over 72       Nutrition Diagnosis:   · Predicted inadequate energy intake related to altered GI function, altered GI structure as evidenced by diarrhea    Nutrition Interventions:   Food and/or Nutrient Delivery:  Continue Current Diet, Continue Oral Nutrition Supplement, Snacks (Comment)  Nutrition Education/Counseling:  No recommendation at this time Coordination of Nutrition Care:  Continue to monitor while inpatient, Coordination of Community Care, Feeding Assistance/Environment Change    Goals:  Pt will consume at least half of her meals and supplements       Nutrition Monitoring and Evaluation:   Behavioral-Environmental Outcomes:  None Identified   Food/Nutrient Intake Outcomes:  Food and Nutrient Intake, Supplement Intake  Physical Signs/Symptoms Outcomes:  Biochemical Data, Diarrhea, GI Status, Nutrition Focused Physical Findings, Skin, Weight     Discharge Planning:    Continue Oral Nutrition Supplement, Continue current diet     Electronically signed by Ghassan Barfield RD, LD on 6/11/21 at 3:19 PM EDT    Contact: [phone]

## 2021-06-11 NOTE — PROGRESS NOTES
Occupational Therapy  . Occupational Therapy Treatment Note      Name: Norva Hammans MRN: [id number] :   1921   Date:  2021   Admission Date: 2021 Room:  [address] [place] [address]-A     Primary Problem:  The primary encounter diagnosis was Diarrhea, unspecified type. Diagnoses of Hypokalemia, Fatigue, unspecified type, Troponin I above reference range, WAGNER (acute kidney injury) ([facility] [facility] Colitis, and Abdominal aortic aneurysm (AAA) without rupture ([facility] were also pertinent to this visit. Restrictions/Precautions:  Restrictions/Precautions  Restrictions/Precautions: General Precautions, Fall Risk  Required Braces or Orthoses?: No     Communication with other providers:  Per chart review and nurse, patient is appropriate for therapeutic intervention. Subjective:  Patient states:  Pt agreeable to OT Tx session. During standing, pt identified decreased activity tolerance, \"I can't stand long\" or \"I have to sit\" Son reports plan to take patient home again, \"We are getting a hospital bed and home care. \"  Pain: 0/10, denies pain, reports unrated discomfort (location, type, intensity)    Objective:    Observation:  Pt alert and oriented to self and situation. Required increased time for communication c loud clear voice in close proximity to R ear (hearing aid present). Son present throughout Tx session. Objective Measures:  IV RUE, multiple dressings    Treatment, including education:  Therapeutic Activity Training:   Therapeutic activity training was instructed today. Cues were given for safety, sequence, UE/LE placement, awareness, and balance. Activities performed today included bed mobility training, sup-sit, sit-stand transfer training c emphasis on standing tolerance in prep for SPT's. Rolling: Min A + cues for sequencing and use of bed rail  Supine to sit: Mod A for trunk and light intermittent touching to BLE c pt following cues to move them over edge of bed  Scooting:  Mod A for B hips to scoot to EOB and Dep for upward scoot in bed  Sitting balance / tolerance: Varied Min A to SBA, increased assist at trunk for fatigue. Pt tolerated x15 minutes sitting EOB. Sit<>stands: Max A c RW c pt exhibiting difficulty for hand placement. Without AD, pt performed Mod A x1 w/o AD for multiple short stands all <8 seconds c seated rest breaks between to managed activity tolerance. Sit to supine: Min A (trunk) + Mod A to BLE    Extensive education provided to son regarding initial plan for transfers in home setting after pt's son endorses that patient's standing tolerance is less than what is usually was at baseline. Son described usually transferring pt from bed to transport chair and into bathroom into a tight space for a transfer to the toilet (riser) and use of grab bars on edge of tub for support. This therapist recommended use of bedside commode placed next to bed where there was ample room for SPT's and would be safer and less labor intensive for patient. Son also educated for use of gait belt as he reports not using one at baseline and rationale for it's use as a safer technique was provided. Son verbalized understanding for all recommendations, including use of bed pan if pt is not tolerating / safe for SPT and should make sure [facility] OT follows up c patient for safe transfer techniques. All therapeutic intervention performed c emphasis on caregiver training to increase safety and caregiver burden,  dynamic balance / sitting and standing tolerance to inc strength, endurance and act tolerance for inc Indep c ADL tasks, func transfers / mobility. Safety  Patient safely in bed c alarm set at end of session, with call light/phone in reach, and nursing aware. Gait belt was used for func transfers at EOB. No SPT's attempted this Tx session due to pt's limited standing tolerance.     Assessment / Impression:    Patient's tolerance of treatment: Well  Adverse Reaction: None  Significant change in status and impact:

## 2021-06-11 NOTE — PLAN OF CARE
Nutrition Problem #1: Predicted inadequate energy intake  Intervention: Food and/or Nutrient Delivery: Continue Current Diet, Continue Oral Nutrition Supplement, Snacks (Comment)  Nutritional Goals: Pt will consume at least half of her meals and supplements

## 2021-06-11 NOTE — CARE COORDINATION
CM called [place] and spoke with Kim. CM informed her that son is ok with the start of care date to be Monday. CM spoke [place] and confirmed that pt is accepted for [facility] and they will start care. PCp is Kristi Calderon PA with Dr Tess Laura. Upon discharge pt will be going home with [facility] home care  Please call family  Please fax H/P, D/S.  AVS, order, and face sheet to [phone]  Please call [phone]  and inform of discharge  CM called [address] and cancelled referral. [facility]  CM faxed the [facility] DME order for hospital bed.   0826 CM called and spoke with [facility] and confirmed that they have received the order, prob delivery on Monday. Son is aware. Pt may need transport per Med trans please discuss with son.  [facility]

## 2021-06-11 NOTE — PROGRESS NOTES
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 ADLs    Hard of hearing       Diet Adult Oral Nutrition Supplement; Standard High Calorie/High Protein Oral Supplement  ADULT DIET; Regular   DVT Prophylaxis [] Lovenox, []  Heparin, [] SCDs, [] Ambulation   GI Prophylaxis [] PPI,  [] H2 Blocker,  [] Carafate,  [] Diet/Tube Feeds   Code Status Limited   Disposition TBD   MDM      History of Present Illness:     Patient was seen and examined  Hard of hearing  No fever no chills  Denied any abdominal pain no chest pain  Ten point ROS reviewed negative, unless as noted above    Objective:   No intake or output data in the 24 hours ending 06/11/21 1200   Vitals:   Vitals:    06/11/21 0846   BP: 119/71   Pulse: 80   Resp: 16   Temp: 97.9 °F (36.6 °C)   SpO2: 94%     Physical Exam:   GEN Awake female, laying in bed in no apparent distress. Appears given age. EYES Pupils are equally round. No scleral erythema, discharge, or conjunctivitis. HENT Mucous membranes are moist. Oral pharynx without exudates, no evidence of thrush. NECK Supple, no apparent thyromegaly or masses. RESP Clear to auscultation, no wheezes, rales or rhonchi. Symmetric chest movement while on room air. CARDIO/VASC S1/S2 auscultated. Regular rate without appreciable murmurs, rubs, or gallops. No JVD or carotid bruits. Peripheral pulses equal bilaterally and palpable. No peripheral edema. GI Abdomen is soft without significant tenderness, masses, or guarding. Bowel sounds are normoactive. Rectal exam deferred.  No costovertebral angle tenderness. Alexandre catheter is not present. HEME/LYMPH No palpable cervical lymphadenopathy and no hepatosplenomegaly. No petechiae or ecchymoses. MSK No gross joint deformities. SKIN Normal coloration, warm, dry. NEURO No focal deficits  PSYCH Awake, alert.   Affect

## 2021-06-12 LAB
ALBUMIN SERPL-MCNC: 2.9 GM/DL (ref 3.4–5)
ALP BLD-CCNC: 73 IU/L (ref 40–129)
ALT SERPL-CCNC: <5 U/L (ref 10–40)
ANION GAP SERPL CALCULATED.3IONS-SCNC: 9 MMOL/L (ref 4–16)
AST SERPL-CCNC: 6 IU/L (ref 15–37)
BASOPHILS ABSOLUTE: 0.1 K/CU MM
BASOPHILS RELATIVE PERCENT: 0.6 % (ref 0–1)
BILIRUB SERPL-MCNC: 0.2 MG/DL (ref 0–1)
BUN BLDV-MCNC: 9 MG/DL (ref 6–23)
CALCIUM SERPL-MCNC: 7.7 MG/DL (ref 8.3–10.6)
CHLORIDE BLD-SCNC: 108 MMOL/L (ref 99–110)
CO2: 23 MMOL/L (ref 21–32)
CREAT SERPL-MCNC: 0.8 MG/DL (ref 0.6–1.1)
DIFFERENTIAL TYPE: ABNORMAL
EOSINOPHILS ABSOLUTE: 0.2 K/CU MM
EOSINOPHILS RELATIVE PERCENT: 2.1 % (ref 0–3)
GFR AFRICAN AMERICAN: >60 ML/MIN/1.73M2
GFR NON-AFRICAN AMERICAN: >60 ML/MIN/1.73M2
GLUCOSE BLD-MCNC: 110 MG/DL (ref 70–99)
HCT VFR BLD CALC: 30.5 % (ref 37–47)
HEMOGLOBIN: 9.6 GM/DL (ref 12.5–16)
IMMATURE NEUTROPHIL %: 1.3 % (ref 0–0.43)
LYMPHOCYTES ABSOLUTE: 0.8 K/CU MM
LYMPHOCYTES RELATIVE PERCENT: 10.7 % (ref 24–44)
MCH RBC QN AUTO: 26.7 PG (ref 27–31)
MCHC RBC AUTO-ENTMCNC: 31.5 % (ref 32–36)
MCV RBC AUTO: 85 FL (ref 78–100)
MONOCYTES ABSOLUTE: 0.8 K/CU MM
MONOCYTES RELATIVE PERCENT: 9.8 % (ref 0–4)
NUCLEATED RBC %: 0 %
PDW BLD-RTO: 14.9 % (ref 11.7–14.9)
PLATELET # BLD: 226 K/CU MM (ref 140–440)
PMV BLD AUTO: 10.5 FL (ref 7.5–11.1)
POTASSIUM SERPL-SCNC: 3.4 MMOL/L (ref 3.5–5.1)
RBC # BLD: 3.59 M/CU MM (ref 4.2–5.4)
SEGMENTED NEUTROPHILS ABSOLUTE COUNT: 5.9 K/CU MM
SEGMENTED NEUTROPHILS RELATIVE PERCENT: 75.5 % (ref 36–66)
SODIUM BLD-SCNC: 140 MMOL/L (ref 135–145)
TOTAL IMMATURE NEUTOROPHIL: 0.1 K/CU MM
TOTAL NUCLEATED RBC: 0 K/CU MM
TOTAL PROTEIN: 5.4 GM/DL (ref 6.4–8.2)
WBC # BLD: 7.8 K/CU MM (ref 4–10.5)

## 2021-06-12 PROCEDURE — 94761 N-INVAS EAR/PLS OXIMETRY MLT: CPT

## 2021-06-12 PROCEDURE — 2500000003 HC RX 250 WO HCPCS: Performed by: INTERNAL MEDICINE

## 2021-06-12 PROCEDURE — 6370000000 HC RX 637 (ALT 250 FOR IP): Performed by: INTERNAL MEDICINE

## 2021-06-12 PROCEDURE — 80053 COMPREHEN METABOLIC PANEL: CPT

## 2021-06-12 PROCEDURE — 2580000003 HC RX 258: Performed by: INTERNAL MEDICINE

## 2021-06-12 PROCEDURE — 1200000000 HC SEMI PRIVATE

## 2021-06-12 PROCEDURE — 85025 COMPLETE CBC W/AUTO DIFF WBC: CPT

## 2021-06-12 PROCEDURE — 36415 COLL VENOUS BLD VENIPUNCTURE: CPT

## 2021-06-12 PROCEDURE — 6360000002 HC RX W HCPCS: Performed by: INTERNAL MEDICINE

## 2021-06-12 RX ADMIN — METRONIDAZOLE 500 MG: 500 INJECTION, SOLUTION INTRAVENOUS at 08:53

## 2021-06-12 RX ADMIN — HEPARIN SODIUM 5000 UNITS: 5000 INJECTION INTRAVENOUS; SUBCUTANEOUS at 05:31

## 2021-06-12 RX ADMIN — SODIUM CHLORIDE, PRESERVATIVE FREE 10 ML: 5 INJECTION INTRAVENOUS at 20:27

## 2021-06-12 RX ADMIN — ASPIRIN 325 MG ORAL TABLET 325 MG: 325 PILL ORAL at 08:52

## 2021-06-12 RX ADMIN — HEPARIN SODIUM 5000 UNITS: 5000 INJECTION INTRAVENOUS; SUBCUTANEOUS at 20:24

## 2021-06-12 RX ADMIN — LEVOTHYROXINE SODIUM 25 MCG: 25 TABLET ORAL at 08:52

## 2021-06-12 RX ADMIN — TRAZODONE HYDROCHLORIDE 50 MG: 50 TABLET ORAL at 20:24

## 2021-06-12 RX ADMIN — HEPARIN SODIUM 5000 UNITS: 5000 INJECTION INTRAVENOUS; SUBCUTANEOUS at 15:42

## 2021-06-12 RX ADMIN — PROPRANOLOL HYDROCHLORIDE 20 MG: 10 TABLET ORAL at 08:52

## 2021-06-12 RX ADMIN — CIPROFLOXACIN 400 MG: 2 INJECTION, SOLUTION INTRAVENOUS at 00:51

## 2021-06-12 RX ADMIN — METRONIDAZOLE 500 MG: 500 INJECTION, SOLUTION INTRAVENOUS at 00:12

## 2021-06-12 ASSESSMENT — PAIN SCALES - GENERAL: PAINLEVEL_OUTOF10: 0

## 2021-06-12 NOTE — PROGRESS NOTES
DOING FAIR HAS SOME LOOSE DARK COLORED STOOLS NO VOMITING  VITALS STABLE   LABS NOTED STOOL STUDIES NEGATIVE  WILL CPM  F/U LABS NO NEED FOR COLONOSCOPY BECAUSE OF PTS AGE

## 2021-06-12 NOTE — PROGRESS NOTES
Hospitalist Progress Note      Name:  Nehemiah Weller /Age/Sex: 1921  (80 y.o. female)   MRN & CSN:  [id number] & [id number] Admission Date/Time: 2021  5:48 PM   Location:  [address] [place] [address]- PCP: Agustina Sood MD         Hospital Day: 5    Assessment and Plan:   Nehemiah Weller is a 80 y.o.  female  who presents with diarrhea     1) Acute on chronic colitis:  -CT abdomen/pelvis-diffuse mural thickening of the large bowel, especially the transverse region and on the left, compatible with nonspecific colitis. -GI disease panel negative.  -Complete abx today  -Gi on board; continue supportive care     2) Hypokalemia: Secondary to GI loss  -Replace and monitor with repeat BMP     3) WAGNER  -Creatinine 1.5, GFR 32  -Avoid nephrotoxic medication  -Currently improved with IVF  -Discontinue IV fluid     4) Physical debility  -Encourage activity  -Case management consulted    Nehemiah Weller was evaluated today and a DME order was entered for a semi-electric hospital bed due to Congestive Heart failure and because she requires assistance for positioning needs not possible in an ordinary bed. Patient requires the head of the bed to be elevated more than 30 degrees most of the time. Pillows or wedges have been considered and ruled out.  The need for this equipment was discussed with the patient and she understands and is in agreement.     Other chronic medical conditions; medication resumed unless contraindicated   Abdominal aortic aneurysm: Measuring 5.7, distal descending thoracic aortic aneurysm  -Patient son aware-does not want any further work-up/intervention.     Ventral hernia: No surgery recommended     Essential hypertension-continue propranolol     Ménière's disease-continue meclizine as needed     Insomnia-patient on trazodone     Chronic systolic congestive heart failure     PCM, moderate-nutritional supplements added     Paroxysmal a flutter    Subcutaneous 3 times per day    ciprofloxacin  400 mg Intravenous Q24H    metroNIDAZOLE  500 mg Intravenous Q8H    aspirin  325 mg Oral Daily    levothyroxine  25 mcg Oral Daily    propranolol  20 mg Oral Daily    traZODone  50 mg Oral Nightly      Infusions:    sodium chloride       PRN Meds: sodium chloride flush, 5-40 mL, PRN  sodium chloride, 25 mL, PRN  ondansetron, 4 mg, Q8H PRN   Or  ondansetron, 4 mg, Q6H PRN  polyethylene glycol, 17 g, Daily PRN  acetaminophen, 650 mg, Q6H PRN   Or  acetaminophen, 650 mg, Q6H PRN  potassium chloride, 10 mEq, PRN  meclizine, 25 mg, TID PRN  potassium chloride, 40 mEq, PRN   Or  potassium alternative oral replacement, 40 mEq, PRN   Or  potassium chloride, 10 mEq, PRN          Electronically signed by Gardenia Bautista MD on 6/12/2021 at 9:58 AM

## 2021-06-13 LAB
ANION GAP SERPL CALCULATED.3IONS-SCNC: 9 MMOL/L (ref 4–16)
BUN BLDV-MCNC: 7 MG/DL (ref 6–23)
CALCIUM SERPL-MCNC: 8.2 MG/DL (ref 8.3–10.6)
CHLORIDE BLD-SCNC: 105 MMOL/L (ref 99–110)
CO2: 24 MMOL/L (ref 21–32)
CREAT SERPL-MCNC: 0.8 MG/DL (ref 0.6–1.1)
GFR AFRICAN AMERICAN: >60 ML/MIN/1.73M2
GFR NON-AFRICAN AMERICAN: >60 ML/MIN/1.73M2
GLUCOSE BLD-MCNC: 123 MG/DL (ref 70–99)
MAGNESIUM: 2 MG/DL (ref 1.8–2.4)
POTASSIUM SERPL-SCNC: 3.4 MMOL/L (ref 3.5–5.1)
SODIUM BLD-SCNC: 138 MMOL/L (ref 135–145)

## 2021-06-13 PROCEDURE — 6360000002 HC RX W HCPCS: Performed by: INTERNAL MEDICINE

## 2021-06-13 PROCEDURE — 6370000000 HC RX 637 (ALT 250 FOR IP): Performed by: INTERNAL MEDICINE

## 2021-06-13 PROCEDURE — 2580000003 HC RX 258: Performed by: INTERNAL MEDICINE

## 2021-06-13 PROCEDURE — 83735 ASSAY OF MAGNESIUM: CPT

## 2021-06-13 PROCEDURE — 80048 BASIC METABOLIC PNL TOTAL CA: CPT

## 2021-06-13 PROCEDURE — 94761 N-INVAS EAR/PLS OXIMETRY MLT: CPT

## 2021-06-13 PROCEDURE — C9113 INJ PANTOPRAZOLE SODIUM, VIA: HCPCS | Performed by: INTERNAL MEDICINE

## 2021-06-13 PROCEDURE — 36415 COLL VENOUS BLD VENIPUNCTURE: CPT

## 2021-06-13 PROCEDURE — 1200000000 HC SEMI PRIVATE

## 2021-06-13 RX ORDER — PANTOPRAZOLE SODIUM 40 MG/10ML
40 INJECTION, POWDER, LYOPHILIZED, FOR SOLUTION INTRAVENOUS ONCE
Status: COMPLETED | OUTPATIENT
Start: 2021-06-13 | End: 2021-06-13

## 2021-06-13 RX ORDER — POTASSIUM CHLORIDE 20 MEQ/1
40 TABLET, EXTENDED RELEASE ORAL ONCE
Status: DISCONTINUED | OUTPATIENT
Start: 2021-06-13 | End: 2021-06-13

## 2021-06-13 RX ORDER — PANTOPRAZOLE SODIUM 40 MG/1
40 TABLET, DELAYED RELEASE ORAL
Qty: 90 TABLET | Refills: 1 | Status: SHIPPED | OUTPATIENT
Start: 2021-06-13

## 2021-06-13 RX ADMIN — POTASSIUM BICARBONATE 40 MEQ: 782 TABLET, EFFERVESCENT ORAL at 10:36

## 2021-06-13 RX ADMIN — ASPIRIN 325 MG ORAL TABLET 325 MG: 325 PILL ORAL at 08:56

## 2021-06-13 RX ADMIN — PROPRANOLOL HYDROCHLORIDE 20 MG: 10 TABLET ORAL at 08:56

## 2021-06-13 RX ADMIN — PANTOPRAZOLE SODIUM 40 MG: 40 INJECTION, POWDER, FOR SOLUTION INTRAVENOUS at 10:36

## 2021-06-13 RX ADMIN — HEPARIN SODIUM 5000 UNITS: 5000 INJECTION INTRAVENOUS; SUBCUTANEOUS at 05:25

## 2021-06-13 RX ADMIN — TRAZODONE HYDROCHLORIDE 50 MG: 50 TABLET ORAL at 20:21

## 2021-06-13 RX ADMIN — LEVOTHYROXINE SODIUM 25 MCG: 25 TABLET ORAL at 05:26

## 2021-06-13 RX ADMIN — HEPARIN SODIUM 5000 UNITS: 5000 INJECTION INTRAVENOUS; SUBCUTANEOUS at 14:25

## 2021-06-13 RX ADMIN — SODIUM CHLORIDE, PRESERVATIVE FREE 10 ML: 5 INJECTION INTRAVENOUS at 08:56

## 2021-06-13 RX ADMIN — HEPARIN SODIUM 5000 UNITS: 5000 INJECTION INTRAVENOUS; SUBCUTANEOUS at 20:21

## 2021-06-13 ASSESSMENT — PAIN SCALES - GENERAL
PAINLEVEL_OUTOF10: 0

## 2021-06-13 NOTE — PROGRESS NOTES
Hospitalist Progress Note      Name:  Caitlyn Rueda /Age/Sex: 1921  (80 y.o. female)   MRN & CSN:  [id number] & [id number] Admission Date/Time: 2021  5:48 PM   Location:  [address] [place] [address]-A PCP: Maynor Yost, [address] Day: 6    Assessment and Plan:   Caitlyn Rueda is a 80 y.o.  female  who presents with diarrhea     1) Acute on chronic colitis:  -CT abdomen/pelvis-diffuse mural thickening of the large bowel, especially the transverse region and on the left, compatible with nonspecific colitis. -GI disease panel negative.  -Completed antibiotic  -Gi on board; continue supportive care. No colonoscopy     2) Hypokalemia: Secondary to GI loss  -Replace and monitor with repeat BMP     3) WAGNER  -Creatinine 1.5, GFR 32  -Avoid nephrotoxic medication  -Currently improved with IVF  -Discontinued IV fluid     4) Physical debility  -Encourage activity  -Case management consulted     Other chronic medical conditions; medication resumed unless contraindicated   Abdominal aortic aneurysm: Measuring 5.7, distal descending thoracic aortic aneurysm  -Patient son aware-does not want any further work-up/intervention.     Ventral hernia: No surgery recommended     Essential hypertension-continue propranolol     Ménière's disease-continue meclizine as needed     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 ADLs    Hard of hearing       Diet Adult Oral Nutrition Supplement; Standard High Calorie/High Protein Oral Supplement  ADULT DIET;  Regular   DVT Prophylaxis [] Lovenox, []  Heparin, [] SCDs, [] Ambulation   GI Prophylaxis [] PPI,  [] H2 Blocker,  [] Carafate,  [] Diet/Tube Feeds   Code Status Limited   Disposition  home health care; awaiting hospital bed to be delivered   MDM      History of Present Illness: Patient was seen and examined  Hard of hearing  Having BM  No new complaints today  Ten point ROS reviewed negative, unless as noted above    Objective: Intake/Output Summary (Last 24 hours) at 6/13/2021 1235  Last data filed at 6/13/2021 0856  Gross per 24 hour   Intake 210 ml   Output    Net 210 ml      Vitals:   Vitals:    06/13/21 0854   BP: (!) 134/101   Pulse: 84   Resp: 18   Temp: 97.8 °F (36.6 °C)   SpO2: 92%     Physical Exam:   GEN Awake female, laying in bed in no apparent distress. Appears given age. EYES Pupils are equally round. No scleral erythema, discharge, or conjunctivitis. HENT Mucous membranes are moist. Oral pharynx without exudates, no evidence of thrush. NECK Supple, no apparent thyromegaly or masses. RESP Clear to auscultation, no wheezes, rales or rhonchi. Symmetric chest movement while on room air. CARDIO/VASC S1/S2 auscultated. Regular rate without appreciable murmurs, rubs, or gallops. No JVD or carotid bruits. Peripheral pulses equal bilaterally and palpable. No peripheral edema. GI Abdomen is soft without significant tenderness, masses, or guarding. Bowel sounds are normoactive. Rectal exam deferred.  No costovertebral angle tenderness. Alexandre catheter is not present. HEME/LYMPH No palpable cervical lymphadenopathy and no hepatosplenomegaly. No petechiae or ecchymoses. MSK No gross joint deformities. SKIN Normal coloration, warm, dry. NEURO No focal deficits  PSYCH Awake, alert. Affect appropriate.     Medications:   Medications:    sodium chloride flush  5-40 mL Intravenous 2 times per day    heparin (porcine)  5,000 Units Subcutaneous 3 times per day    aspirin  325 mg Oral Daily    levothyroxine  25 mcg Oral Daily    propranolol  20 mg Oral Daily    traZODone  50 mg Oral Nightly      Infusions:    sodium chloride       PRN Meds: sodium chloride flush, 5-40 mL, PRN  sodium chloride, 25 mL, PRN  ondansetron, 4 mg, Q8H PRN   Or  ondansetron, 4 mg, Q6H PRN polyethylene glycol, 17 g, Daily PRN  acetaminophen, 650 mg, Q6H PRN   Or  acetaminophen, 650 mg, Q6H PRN  potassium chloride, 10 mEq, PRN  meclizine, 25 mg, TID PRN  potassium chloride, 40 mEq, PRN   Or  potassium alternative oral replacement, 40 mEq, PRN   Or  potassium chloride, 10 mEq, PRN          Electronically signed by Anali Polk MD on 6/13/2021 at 12:35 PM

## 2021-06-13 NOTE — PLAN OF CARE
Problem: Falls - Risk of:  Goal: Will remain free from falls  Description: Will remain free from falls  Outcome: Ongoing  Goal: Absence of physical injury  Description: Absence of physical injury  Outcome: Ongoing     Problem: Skin Integrity:  Goal: Will show no infection signs and symptoms  Description: Will show no infection signs and symptoms  Outcome: Ongoing  Goal: Absence of new skin breakdown  Description: Absence of new skin breakdown  Outcome: Ongoing     Problem: Diarrhea:  Goal: Bowel elimination is within specified parameters  Description: Bowel elimination is within specified parameters  Outcome: Ongoing  Goal: Passage of soft, formed stool  Description: Passage of soft, formed stool  Outcome: Ongoing  Goal: Establishment of normal bowel function will improve to within specified parameters  Description: Establishment of normal bowel function will improve to within specified parameters  Outcome: Ongoing     Problem: Nutrition  Goal: Optimal nutrition therapy  Outcome: Ongoing

## 2021-06-14 LAB
ANION GAP SERPL CALCULATED.3IONS-SCNC: 10 MMOL/L (ref 4–16)
BUN BLDV-MCNC: 9 MG/DL (ref 6–23)
CALCIUM SERPL-MCNC: 8.3 MG/DL (ref 8.3–10.6)
CHLORIDE BLD-SCNC: 104 MMOL/L (ref 99–110)
CO2: 25 MMOL/L (ref 21–32)
CREAT SERPL-MCNC: 0.8 MG/DL (ref 0.6–1.1)
GFR AFRICAN AMERICAN: >60 ML/MIN/1.73M2
GFR NON-AFRICAN AMERICAN: >60 ML/MIN/1.73M2
GLUCOSE BLD-MCNC: 131 MG/DL (ref 70–99)
MAGNESIUM: 2 MG/DL (ref 1.8–2.4)
POTASSIUM SERPL-SCNC: 4.1 MMOL/L (ref 3.5–5.1)
SODIUM BLD-SCNC: 139 MMOL/L (ref 135–145)

## 2021-06-14 PROCEDURE — 80048 BASIC METABOLIC PNL TOTAL CA: CPT

## 2021-06-14 PROCEDURE — 83735 ASSAY OF MAGNESIUM: CPT

## 2021-06-14 PROCEDURE — 36415 COLL VENOUS BLD VENIPUNCTURE: CPT

## 2021-06-14 PROCEDURE — 6360000002 HC RX W HCPCS: Performed by: INTERNAL MEDICINE

## 2021-06-14 PROCEDURE — 2580000003 HC RX 258: Performed by: INTERNAL MEDICINE

## 2021-06-14 PROCEDURE — 1200000000 HC SEMI PRIVATE

## 2021-06-14 PROCEDURE — 94761 N-INVAS EAR/PLS OXIMETRY MLT: CPT

## 2021-06-14 PROCEDURE — 6370000000 HC RX 637 (ALT 250 FOR IP): Performed by: INTERNAL MEDICINE

## 2021-06-14 RX ORDER — ASPIRIN 81 MG/1
81 TABLET ORAL DAILY
Qty: 90 TABLET | Refills: 1 | Status: SHIPPED | OUTPATIENT
Start: 2021-06-14

## 2021-06-14 RX ADMIN — ASPIRIN 325 MG ORAL TABLET 325 MG: 325 PILL ORAL at 09:11

## 2021-06-14 RX ADMIN — HEPARIN SODIUM 5000 UNITS: 5000 INJECTION INTRAVENOUS; SUBCUTANEOUS at 05:25

## 2021-06-14 RX ADMIN — HEPARIN SODIUM 5000 UNITS: 5000 INJECTION INTRAVENOUS; SUBCUTANEOUS at 14:54

## 2021-06-14 RX ADMIN — SODIUM CHLORIDE, PRESERVATIVE FREE 10 ML: 5 INJECTION INTRAVENOUS at 09:13

## 2021-06-14 RX ADMIN — PROPRANOLOL HYDROCHLORIDE 20 MG: 10 TABLET ORAL at 09:11

## 2021-06-14 RX ADMIN — LEVOTHYROXINE SODIUM 25 MCG: 25 TABLET ORAL at 05:25

## 2021-06-14 ASSESSMENT — PAIN SCALES - GENERAL
PAINLEVEL_OUTOF10: 0

## 2021-06-14 NOTE — PROGRESS NOTES
6/15/21 Patient initially planned to d/c home with [facility] 6/14 and d/c was hold off as  family requested SNF placement yesterday. Due to lack of safe dischaarge plan, patient remained hospitalized. Patient is approved for [facility] today and will be d/cd SNF later afternoon. Subjective: Overnight uneventful. Patient [place] ([place]). Son at bed side assisted with answering questions. Per chart review one episode of diarrhea with in last 24 hrs. VS stable. Denies abdominal pain, nausea, sob and chest pain. GEN    Awake female, lying in bed in no apparent distress. Appears given age. EYES   Pupils are equally round. No scleral erythema, discharge, or conjunctivitis. HENT  Mucous membranes are moist. Oral pharynx without exudates, no evidence of thrush. NECK  Supple, no apparent thyromegaly or masses. RESP  Clear to auscultation, no wheezes, rales or rhonchi. Symmetric chest movement while on room air. CARDIO/VASC           S1/S2 auscultated. Regular rate without appreciable murmurs, rubs, or gallops. No JVD or carotid bruits. Peripheral pulses equal bilaterally and palpable. No peripheral edema. GI        Abdomen is soft without significant tenderness, masses, or guarding. Bowel sounds are normoactive. Rectal exam deferred.        No costovertebral angle tenderness. Normal appearing external genitalia. Alexandre catheter is not present. HEME/LYMPH            No palpable cervical lymphadenopathy and no hepatosplenomegaly. No petechiae or ecchymoses. MSK    No gross joint deformities. SKIN     pressure injury stage 2 left hip, Multiple bruises/ skin tear to  right elbow,right and left leg, Normal coloration, warm, dry. NEURO           Cranial nerves appear grossly intact, normal speech, no lateralizing weakness. PSYCH            Awake, alert, oriented x 4. Affect appropriate. SEE below as discharge diagnosis, mediations, and follow up are as below.     Time spent with Patient with today's visit 32 minutes Discharge Summary    Name:  Susan Sanchez /Age/Sex: 1921  (80 y.o. female)   MRN & CSN:  [id number] & [id number] Admission Date/Time: 2021  5:48 PM   Attending:  Frank Johnson MD Discharging Physician: Ilya Rey MD     Hospital Course:   Luci Bernstein is a 80 y.o.  female  who presents with diarrhea    Patient was seen and examined  No worsening chest pain or abdominal pain  No worsening diarrhea reported overnight  No acute events overnight     Assessment and plan  1) Acute on chronic colitis:  -CT abdomen/pelvis-diffuse mural thickening of the large bowel, especially the transverse region and on the left, compatible with nonspecific colitis. -GI disease panel negative.  -Completed antibiotic  -Gi on board; continue supportive care. No colonoscopy     2) Hypokalemia: Secondary to GI loss  -Improved  -Replace and monitor with repeat BMP     3) WAGNER  -Creatinine 1.5, GFR 32  -Avoid nephrotoxic medication  -Currently improved with IVF  -Discontinued IV fluid     4) Physical debility  -Encourage activity  -Continue home therapy     Other chronic medical conditions; medication resumed unless contraindicated   Abdominal aortic aneurysm: Measuring 5.7, distal descending thoracic aortic aneurysm  -Patient son aware-does not want any further work-up/intervention.     Ventral hernia: No surgery recommended     Essential hypertension-continue propranolol     Ménière's disease-continue meclizine as needed     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 ADLs     Hard of hearing    The patient expressed appropriate understanding of and agreement with the discharge recommendations, medications, and plan.      Consults this masses, or guarding. Bowel sounds are normoactive. Rectal exam deferred.  No costovertebral angle tenderness. Alexandre catheter is not present. HEME/LYMPH No palpable cervical lymphadenopathy and no hepatosplenomegaly. No petechiae or ecchymoses. MSK No gross joint deformities. SKIN Normal coloration, warm, dry. NEURO hard of hearing; no focal deficits  PSYCH Awake, alert. Affect appropriate.     BMP/CBC  Recent Labs     06/12/21  0734 06/13/21  1047 06/14/21  0735    138 139   K 3.4* 3.4* 4.1    105 104   CO2 23 24 25   BUN 9 7 9   CREATININE 0.8 0.8 0.8   WBC 7.8  --   --    HCT 30.5*  --   --      --   --        IMAGING:  As above    Discharge Time of 35 minutes    Electronically signed by Leigh Ann Bartlett MD on 6/14/2021 at 11:08 AM

## 2021-06-15 VITALS
WEIGHT: 108.5 LBS | HEART RATE: 82 BPM | DIASTOLIC BLOOD PRESSURE: 88 MMHG | SYSTOLIC BLOOD PRESSURE: 122 MMHG | TEMPERATURE: 98.2 F | OXYGEN SATURATION: 92 % | HEIGHT: 62 IN | RESPIRATION RATE: 16 BRPM | BODY MASS INDEX: 19.96 KG/M2

## 2021-06-15 PROCEDURE — 2580000003 HC RX 258: Performed by: INTERNAL MEDICINE

## 2021-06-15 PROCEDURE — 6360000002 HC RX W HCPCS: Performed by: INTERNAL MEDICINE

## 2021-06-15 PROCEDURE — 6370000000 HC RX 637 (ALT 250 FOR IP): Performed by: INTERNAL MEDICINE

## 2021-06-15 PROCEDURE — 94761 N-INVAS EAR/PLS OXIMETRY MLT: CPT

## 2021-06-15 PROCEDURE — 97112 NEUROMUSCULAR REEDUCATION: CPT

## 2021-06-15 PROCEDURE — 97530 THERAPEUTIC ACTIVITIES: CPT

## 2021-06-15 RX ADMIN — PROPRANOLOL HYDROCHLORIDE 20 MG: 10 TABLET ORAL at 07:37

## 2021-06-15 RX ADMIN — LEVOTHYROXINE SODIUM 25 MCG: 25 TABLET ORAL at 06:07

## 2021-06-15 RX ADMIN — HEPARIN SODIUM 5000 UNITS: 5000 INJECTION INTRAVENOUS; SUBCUTANEOUS at 06:07

## 2021-06-15 RX ADMIN — SODIUM CHLORIDE, PRESERVATIVE FREE 10 ML: 5 INJECTION INTRAVENOUS at 07:38

## 2021-06-15 RX ADMIN — HEPARIN SODIUM 5000 UNITS: 5000 INJECTION INTRAVENOUS; SUBCUTANEOUS at 14:18

## 2021-06-15 RX ADMIN — ASPIRIN 325 MG ORAL TABLET 325 MG: 325 PILL ORAL at 07:37

## 2021-06-15 ASSESSMENT — PAIN SCALES - GENERAL: PAINLEVEL_OUTOF10: 0

## 2021-06-15 NOTE — PROGRESS NOTES
Occupational Therapy  Occupational Therapy Treatment Note    Date:  6/15/2021   Room:  [address] [place] [address]    Neil Lew :   1921   MRN: [id number] Admission Date: 2021     Diagnosis:  The primary encounter diagnosis was Diarrhea, unspecified type. Diagnoses of Hypokalemia, Fatigue, unspecified type, Troponin I above reference range, WAGNER (acute kidney injury) ([facility] [address] Colitis, and Abdominal aortic aneurysm (AAA) without rupture ([facility] [address] were also pertinent to this visit. Restrictions/Precautions:    Restrictions/Precautions  Restrictions/Precautions: General Precautions, Fall Risk  Required Braces or Orthoses?: No                 Communication with other providers:  PT.    Subjective:  Patient states:  \"no\" re: sitting in chair , sitting  Pain:   Location, Type, Intensity (0/10 to 10/10):  none    Objective:    Orientation: ox self  Observation: Pt received in bed. Frail. Alert and cooperative. Objective Measures:      Treatment, including education:  Therapeutic Activity Training:   Therapeutic activity training was instructed today. Cues were given for safety, sequence, UE/LE placement, visual cues, and balance. Supine to sit: MaxA    Sitting: SBA ~5 mins, recurrent retro-lean which she cannot correct without physical assistance    Sit to stand: DEP partial stand     Standing: cannot stand today    Sit to supine: DEP     Assessment / Impression:      Patient's tolerance of treatment:  fair   Significant change in status and impact:  Decline in status  Barriers to improvement:  Disinterested in any type of strenuous activity  Recommendations: LTC    Plan for Next Session:    Cont POC addressing goals:    Goals  Short term goals  Time Frame for Short term goals: discharge  Short term goal 1: Pt will complete UB/LB dressing Mao. Short term goal 2: Pt will complete UB/LB bathing Mao. Short term goal 3: Pt will complete all aspects of toileting Mao.   Short term goal 4: Pt will complete functional activity in stand to inc activity tolerance and endurance to stand ~ 5mins. Short term goal 5: Pt will perform ther ex/ther act to inc UB strength from 4/5 to 5/5    Safety: Left in bed with all needs in reach. bed alarm applied. Gait belt used for transfer and mobility. Time in:  0935  Time out:  0847  Timed treatment minutes:  12  Total treatment time:  12    Electronically signed by: [address], OTR/L, [address]   [id number]   9:52 AM, 6/15/2021      Previously filed values:     Short term goals  Time Frame for Short term goals: discharge  Short term goal 1: Pt will complete UB/LB dressing Mao. Short term goal 2: Pt will complete UB/LB bathing Mao. Short term goal 3: Pt will complete all aspects of toileting Mao. Short term goal 4: Pt will complete functional activity in stand to inc activity tolerance and endurance to stand ~ 5mins. Short term goal 5: Pt will perform ther ex/ther act to inc UB strength from 4/5 to 5/5.

## 2021-06-15 NOTE — PROGRESS NOTES
Hospitalist Progress Note      Name:  Magda Berkowitz /Age/Sex: 1921  (80 y.o. female)   MRN & CSN:  [id number] & [id number] Admission Date/Time: 2021  5:48 PM   Location:  [address] [place] [address] PCP: Ashli Stafford MD         Hospital Day: 8    Assessment and Plan:   Magda Berkowitz is a 80 y.o.  female  who presents with diarrhea/ lethargy    Assessment and plan   · Acute on chronic colitis  · -CT abdomen/pelvis diffuse mural thickening of the lateral aspect of the transverse region and on the left compatible with nonspecific colitis  · GI PCR  negative,  · Completed metronidazole and Cipro for 7 days  · GI consult appreciate recommendation, no colonoscopy  · Hypokalemia: Secondary to GI loss-resolved  · WAGNER: Cr 0.8 on 2021  (on admission CR 1.5, GFR 32) improved with IV fluids  · Physical debility: PT OT evaluation, continue therapy    Other chronic medical conditions: Medication resumed unless contraindicated  · Abdominal aortic aneurysm: Measuring 5.7, distal descending thoracic aortic aneurysm,-Patient son aware-does not want any further work-up/intervention. · Ventral hernia: No surgery recommended  · Essential hypertension-continue propranolol  · Ménière's disease-continue meclizine as needed  · Insomnia-patient on trazodone  · Chronic systolic congestive heart failure : Echo-2015-EF 40 to 45%  · PCM, moderate-nutritional supplements added  · Paroxysmal a flutter  · wheelchair dependent, dependent on most ADLs   · Hard of hearing     Plan for d/c to SNF today, awaiting approval from SNF    Diet Adult Oral Nutrition Supplement; Standard High Calorie/High Protein Oral Supplement  ADULT DIET;  Regular   DVT Prophylaxis [] Lovenox, []  Heparin, [] SCDs, [] Warfarin  [] NOAC     GI Prophylaxis [] PPI,  [] H2 Blocker,  [] Carafate,  [] Diet/Tube Feeds   [facility]   Code Status Limited   MDM [] Low, [x] Moderate,[]  High     History of Present Illness:     Chief Complaint: 5,000 Units Subcutaneous 3 times per day    aspirin  325 mg Oral Daily    levothyroxine  25 mcg Oral Daily    propranolol  20 mg Oral Daily    traZODone  50 mg Oral Nightly      Infusions:    sodium chloride       PRN Meds: sodium chloride flush, 5-40 mL, PRN  sodium chloride, 25 mL, PRN  ondansetron, 4 mg, Q8H PRN   Or  ondansetron, 4 mg, Q6H PRN  polyethylene glycol, 17 g, Daily PRN  acetaminophen, 650 mg, Q6H PRN   Or  acetaminophen, 650 mg, Q6H PRN  potassium chloride, 10 mEq, PRN  meclizine, 25 mg, TID PRN  potassium chloride, 40 mEq, PRN   Or  potassium alternative oral replacement, 40 mEq, PRN   Or  potassium chloride, 10 mEq, PRN        No results for input(s): WBC, HGB, HCT, PLT in the last 72 hours. Recent Labs     06/13/21  1047 06/14/21  0735    139   K 3.4* 4.1    104   CO2 24 25   BUN 7 9   CREATININE 0.8 0.8     No results for input(s): AST, ALT, ALB, BILIDIR, BILITOT, ALKPHOS in the last 72 hours. No results for input(s): INR in the last 72 hours. No results for input(s): CKTOTAL, CKMB, CKMBINDEX, TROPONINT in the last 72 hours. Imaging reviewed  CT ABDOMEN PELVIS WO CONTRAST Additional Contrast? None    Result Date: 6/8/2021  EXAMINATION: CT OF THE ABDOMEN AND PELVIS WITHOUT CONTRAST 6/8/2021 5:18 pm TECHNIQUE: CT of the abdomen and pelvis was performed without the administration of intravenous contrast. Multiplanar reformatted images are provided for review. Dose modulation, iterative reconstruction, and/or weight based adjustment of the mA/kV was utilized to reduce the radiation dose to as low as reasonably achievable.  COMPARISON: 01/21/2018 HISTORY: ORDERING SYSTEM PROVIDED HISTORY: diarrhea, WAGNER TECHNOLOGIST PROVIDED HISTORY: Reason for exam:->diarrhea, WAGNER Additional Contrast?->None Decision Support Exception - unselect if not a suspected or confirmed emergency medical condition->Emergency Medical Condition (MA) Reason for Exam: failure to thrive,reduce intake of food,diarrhea for months,WAGNER Acuity: Unknown Type of Exam: Unknown . FINDINGS: Lower Chest: There is a small left pleural effusion with adjacent airspace disease. Visualized right lung is clear. Base of the heart is unremarkable. There is marked enlargement of the distal thoracic aorta just proximal and at the diaphragmatic hiatus, measuring up to 5.7 cm in caliber, increased when compared to the previous exam (previously 4.6 cm in January 2018). No CT evidence of leakage is seen. Lack of intravenous contrast limits evaluation of the solid abdominal viscera, the hollow abdominal viscera, and the vascular structures. Organs: Having said that, no acute hepatic abnormality identified. Gallbladder is moderately distended but otherwise unremarkable. Noncontrast imaging of the spleen is unremarkable. Bilateral adrenal adenomas are again seen, unchanged, and requiring no specific follow-up. Noncontrast imaging of the pancreas unremarkable. Renal cortical scarring is present on the left, unchanged. No acute or suspicious renal abnormality is identified. GI/Bowel: Mild diverticulosis of the large bowel is present without CT evidence of diverticulitis. Is mural thickening of the large bowel with surrounding fat stranding, especially in the region of the transverse colon. There is a ventral wall hernia just to the right of the rectus muscle, measuring 11.1 cm, with a neck measuring approximately 3.9 cm. That hernia has stayed relatively stable in size since the previous exam.  There is increasing edema within the fat, which is likely secondary to colitis. No evidence of obstruction is seen. The distal esophagus, stomach, duodenal sweep, and the remainder of the small bowel are unremarkable. No evidence of small bowel obstruction. Pelvis: Uterus is either atrophied or has been previously resected. Chronic cyst in the right adnexal space is unchanged at 2.2 cm, and requires no specific follow-up.   No free pelvic fluid is found. Urinary bladder unremarkable. Peritoneum/Retroperitoneum: The aneurysm of the thoracic aorta extends into the abdomen,, with the abdominal aorta measuring 3.8 cm at the level of the left renal vein. Again, no evidence of leakage. No retroperitoneal lymphadenopathy is identified. Bones/Soft Tissues: Chronic compression fracture involving the superior endplate of L1 noted. No acute bony abnormalities are identified. Diffuse mural thickening of the large bowel, especially the transverse region and on the left, compatible with nonspecific colitis. There is a moderate to large ventral wall hernia just to the right of the rectus abdominus muscle containing a loop of large bowel. No evidence of obstruction is seen, however. Very large distal descending thoracic aortic aneurysm, which extends into the upper abdomen. Nonemergent vascular surgical consultation may be considered, based upon the patient's life expectancy and comorbidities. See full recommendations below. RECOMMENDATIONS: 5.7 cm abdominal aortic aneurysm. Recommend referral to a vascular specialist. Reference: [facility] 0068;[id number]. CT HEAD WO CONTRAST    Result Date: 6/8/2021  EXAMINATION: CT OF THE HEAD WITHOUT CONTRAST  6/8/2021 8:17 pm TECHNIQUE: CT of the head was performed without the administration of intravenous contrast. Dose modulation, iterative reconstruction, and/or weight based adjustment of the mA/kV was utilized to reduce the radiation dose to as low as reasonably achievable. COMPARISON: 11/04/2016 HISTORY: ORDERING SYSTEM PROVIDED HISTORY: lethargy TECHNOLOGIST PROVIDED HISTORY: Reason for exam:->lethargy Has a \"code stroke\" or \"stroke alert\" been called? ->No Decision Support Exception - unselect if not a suspected or confirmed emergency medical condition->Emergency Medical Condition (MA) Reason for Exam: lethargy Acuity: Unknown Type of Exam: Unknown Additional signs and symptoms: failure to thrive FINDINGS: BRAIN/VENTRICLES: There is age-appropriate atrophy and there is moderate to severe patchy periventricular and subcortical white matter low attenuation that is nonspecific but most consistent with chronic small vessel ischemia. There are bilateral remote basal ganglia lacunar infarcts including a remote right thalamic lacunar infarct. There is no evidence of acute hemorrhage, mass or extra-axial fluid collection. Bulky calcified plaque in the intracranial internal carotid arteries. ORBITS: The visualized portion of the orbits demonstrate no acute abnormality. SINUSES: The visualized paranasal sinuses and mastoid air cells demonstrate no acute abnormality. SOFT TISSUES/SKULL:  No acute abnormality of the visualized skull or soft tissues. No acute intracranial abnormality. Generalized atrophy. Chronic ischemic changes as above.          Electronically signed by NGUYEN Brower on 6/15/2021 at 8:50 AM

## 2021-06-15 NOTE — PROGRESS NOTES
Comprehensive Nutrition Assessment    Type and Reason for Visit:  Reassess    Nutrition Recommendations/Plan:   Continue regular diet   Modify supplements to TID   Assist with meals, encourage adequate po intake     Nutrition Assessment:  Pt's son was feeding pt this morning at visit. Pt did not order breakfast due to expecting d/c to SNF. Reports still with decreased appetite and diarrhea. Patient with [address], but pt's son states pt is typically eats small portions, dislikes kefir but enjoys the ensures. Will modify supplements. Performed limited visual exam. Pt remains high nutrition risk. Malnutrition Assessment:  Malnutrition Status: At risk for malnutrition (Comment)    Context:  Chronic Illness     Findings of the 6 clinical characteristics of malnutrition:  Energy Intake:  7 - 75% or less estimated energy requirements for 1 month or longer  Weight Loss:  1 - Mild weight loss (specify amount and time period) (4.9% OVER 4 MONTHS)     Body Fat Loss:  1 - Mild body fat loss Orbital, Buccal region, Triceps   Muscle Mass Loss:  1 - Mild muscle mass loss Temples (temporalis), Hand (interosseous)  Fluid Accumulation:  No significant fluid accumulation Extremities   Strength:  Not Performed    Estimated Daily Nutrient Needs:  Energy (kcal):  3702-3824 (30-35 kcal/kg); Weight Used for Energy Requirements:  Current     Protein (g):  50-60 (1-1.2 g/kg IBW); Weight Used for Protein Requirements:  Ideal        Fluid (ml/day):  4242-0095; Method Used for Fluid Requirements:  1 ml/kcal      Nutrition Related Findings:  Noted family wants patient to discharge to SNF      Wounds:  Multiple, Pressure Injury, Stage II, Skin Tears       Current Nutrition Therapies:    Adult Oral Nutrition Supplement; Standard High Calorie/High Protein Oral Supplement  ADULT DIET;  Regular    Anthropometric Measures:  · Height: 5' 2.01\" (157.5 cm)  · Current Body Weight: 108 lb 7.5 oz (49.2 kg)   · Admission Body Weight: 108 lb 3.9 oz (49.1 kg)    · Usual Body Weight: 114 lb 10.2 oz (52 kg) (2/5/18)     · Ideal Body Weight: 110 lbs; % Ideal Body Weight 98.6 %   · BMI: 19.8  · Adjusted Body Weight:  ; No Adjustment   · Adjusted BMI:      · BMI Categories: Underweight (BMI less than 22) age over 72       Nutrition Diagnosis:   · Inadequate oral intake related to altered GI function, altered GI structure (decreased appetite) as evidenced by diarrhea, intake 26-50%, intake 0-25%    Nutrition Interventions:   Food and/or Nutrient Delivery:  Continue Current Diet, Modify Oral Nutrition Supplement  Nutrition Education/Counseling:  No recommendation at this time   Coordination of Nutrition Care:  Continue to monitor while inpatient, Feeding Assistance/Environment Change, Coordination of Community Care    Goals:  Pt will consume at least half of her meals and supplements       Nutrition Monitoring and Evaluation:   Behavioral-Environmental Outcomes:  None Identified   Food/Nutrient Intake Outcomes:  Food and Nutrient Intake, Supplement Intake  Physical Signs/Symptoms Outcomes:  Biochemical Data, Diarrhea, GI Status, Skin, Weight, Nutrition Focused Physical Findings     Discharge Planning:    Continue Oral Nutrition Supplement, Continue current diet     Electronically signed by Irwin James RD, LD on 6/15/21 at 11:21 AM EDT    Contact: [phone]

## 2021-06-15 NOTE — CARE COORDINATION
CM placed a white board for PT/OT updated notes for discharge placement. CM called son and left a VM for him to call CM for determination for discharge planning. [id number] [facility] [address] Son returned call and would like in this order [facility], [facility] and [place] as options. CM e faxed the face sheet to [facility] called [facility] and spoke with Shay. CM waiting for determination. [address]  [address] CM completed E pasr on line. [address]  [address]15 pt is approved for [facility]. CM called Med trans and transport time is 7 pm. No Covid is needed. CM updated nursing. CM updated pts son. Cm placed envelope in soft chart. LH  1155 CM called Corrie at [facility] and informed of transport time.  [address]

## 2021-06-15 NOTE — DISCHARGE INSTR - DIET

## 2021-06-15 NOTE — PROGRESS NOTES
Physical Therapy Treatment Note  Name: Marlena Christianson MRN: [id number] :   1921   Date:  6/15/2021   Admission Date: 2021 Room:  [address] [place], [place] [address]     Restrictions/Precautions:  Restrictions/Precautions  Restrictions/Precautions: General Precautions, Fall Risk  Required Braces or Orthoses?: No         Communication with other providers:  RN, OT    Subjective:  Patient states:  Pt agreeable to therapy. Mostly nods to questions  Pain:   Location, Type, Intensity (0/10 to 10/10): Denies pain but painful with movement    Objective:    Observation:  Pt supine in bed upon entry    Treatment, including education/measures:    Bed mobility: pt completed supine to sit EOB dependently. Pt able to originally move LEs but then stopped following commands. Increased time and effort to complete. Pt completed sit to supine dependently and scooted toward HOB dependently x2. Transfers: Pt denied any STS transfers this date. Balance: Pt sat EOB ~8 minutes CGA with two LOB backward, requiring mod A to recover    Assessment / Impression:    Pt supine in bed at end of session with needs in reach and alarm on. After today's session, pt may be at baseline and would benefit from [facility] placement if family unable to care for pt at home.     Patient's tolerance of treatment:  fair   Adverse Reaction: n/a  Significant change in status and impact:  n/a  Barriers to improvement:  Disinterest in OOB activities, decreased endurance    Plan for Next Session:    Continue to address transfer training and balance    Time in:  0835  Time out:  0847  Timed treatment minutes:  12  Total treatment time:  12    Previously filed items:  Social/Functional History  Lives With: Son  Type of Home: House  ADL Assistance: Needs assistance  Homemaking Assistance: Needs assistance  Homemaking Responsibilities: No  Ambulation Assistance: Needs assistance  Transfer Assistance: Needs assistance  Active : No  Patient's  Info: son drives Additional Comments: Unable to obtain complete social functional due to Pt poor historian.   Short term goals  Time Frame for Short term goals: 1 week  Short term goal 1: Pt to complete all bed mobility mod A  Short term goal 2: Pt to sit EOB 10 minutes CGA  Short term goal 3: Pt to complete bilateral LE strengthening program with min A  Short term goal 4: Pt to complete stand pivot transfer with LRAD max A       Electronically signed by:    Luke Oliveros, PT  6/15/2021, 9:50 AM

## 2021-07-12 NOTE — DISCHARGE SUMMARY
Discharge Summary    Name:  Yaquelin Augustine /Age/Sex: 1921  (80 y.o. female)   MRN & CSN:  [id number] & [id number] Admission Date/Time: 2021  5:48 PM   Attending:  No att. providers found Discharging Physician: Noemí Vela MD     Hospital Course:   Yaquelin Augustine is a 80 y.o.  female  who presents with Colitis     She has past medical history significant for hypertension, Ménière's disease, insomnia chronic congestive heart failure, atrial fibrillation, abdominal aortic aneurysm, who came into the hospital because of diarrhea for 2 weeks. At the ER, her vital signs were stable. Laboratories were significant for hypokalemia hypomagnesemia. CT of the abdomen and pelvis showed mural thickening of the large bowel especially the transverse region and on the left compatible with nonspecific colitis. She was managed as follows    · Acute on chronic colitis  · -CT abdomen/pelvis diffuse mural thickening of the lateral aspect of the transverse region and on the left compatible with nonspecific colitis  · GI PCR  negative,  · Completed metronidazole and Cipro for 7 days  · GI consult appreciate recommendation, no colonoscopy  · Hypokalemia: Secondary to GI loss-resolved  · WAGNER: Cr 0.8 on 2021  (on admission CR 1.5, GFR 32) improved with IV fluids  · Physical debility: PT OT evaluation, continue therapy     Other chronic medical conditions: Medication resumed unless contraindicated  · Abdominal aortic aneurysm: Measuring 5.7, distal descending thoracic aortic aneurysm,-Patient son aware-does not want any further work-up/intervention.   · Ventral hernia: No surgery recommended  · Essential hypertension-continue propranolol  · Ménière's disease-continue meclizine as needed  · Insomnia-patient on trazodone  · Chronic systolic congestive heart failure : Echo-2015-EF 40 to 45%  · PCM, moderate-nutritional supplements added  · Paroxysmal a flutter  · wheelchair dependent, dependent on most ADLs   · Hard of hearing    The patient expressed appropriate understanding of and agreement with the discharge recommendations, medications, and plan. Consults this admission:  IP CONSULT TO CASE MANAGEMENT  IP CONSULT TO HOME CARE NEEDS  IP CONSULT TO HOSPITALIST  IP CONSULT TO GI  IP CONSULT TO IV TEAM    Discharge Instruction:   Follow up appointments: GI  Primary care physician:  within 2 weeks    Diet:  regular diet   Activity: activity as tolerated  Disposition: Discharged to:   [x]Home, []C, []SNF, []Acute Rehab, []Hospice   Condition on discharge: Stable    Discharge Medications:      [facility]   Home Medication Instructions EVN:[id number]    Printed on:07/12/21 3128   Medication Information                      aspirin EC 81 MG EC tablet  Take 1 tablet by mouth daily             levothyroxine (SYNTHROID) 25 MCG tablet  Take 1 tablet by mouth Daily             meclizine (ANTIVERT) 25 MG tablet  Take 25 mg by mouth 3 times daily as needed             pantoprazole (PROTONIX) 40 MG tablet  Take 1 tablet by mouth every morning (before breakfast)             propranolol (INDERAL) 20 MG tablet  Take 20 mg by mouth daily              traZODone (DESYREL) 50 MG tablet  Take 50 mg by mouth nightly                 Objective Findings at Discharge:   /88   Pulse 82   Temp 98.2 °F (36.8 °C) (Oral)   Resp 16   Ht 5' 2.01\" (1.575 m)   Wt 108 lb 8 oz (49.2 kg)   SpO2 92%   BMI 19.84 kg/m²            PHYSICAL EXAM   GEN Awake female, sitting upright in bed in no apparent distress. Appears given age. EYES Pupils are equally round. No scleral erythema, discharge, or conjunctivitis. HENT Mucous membranes are moist. Oral pharynx without exudates, no evidence of thrush. NECK Supple, no apparent thyromegaly or masses. RESP Clear to auscultation, no wheezes, rales or rhonchi. Symmetric chest movement while on room air. CARDIO/VASC S1/S2 auscultated.  Regular rate without appreciable murmurs, rubs, or gallops. No JVD or carotid bruits. Peripheral pulses equal bilaterally and palpable. No peripheral edema. GI Abdomen is soft without significant tenderness, masses, or guarding. Bowel sounds are normoactive. Rectal exam deferred. SKIN Normal coloration, warm, dry. NEURO Cranial nerves appear grossly intact, normal speech, no lateralizing weakness. PSYCH Awake, alert, oriented x 4. Affect appropriate. BMP/CBC  No results for input(s): NA, K, CL, CO2, BUN, CREATININE, WBC, HEMOGLOBIN, HCT, PLT in the last 72 hours. Invalid input(s): GLU    IMAGING:  No results found.       Discharge Time of 25 minutes    Electronically signed by Tony Chapman MD on 7/12/2021 at 12:16 PM